# Patient Record
Sex: MALE | Race: WHITE | NOT HISPANIC OR LATINO | URBAN - METROPOLITAN AREA
[De-identification: names, ages, dates, MRNs, and addresses within clinical notes are randomized per-mention and may not be internally consistent; named-entity substitution may affect disease eponyms.]

---

## 2017-05-05 ENCOUNTER — INPATIENT (INPATIENT)
Facility: HOSPITAL | Age: 47
LOS: 18 days | Discharge: HOME | DRG: 885 | End: 2017-05-24
Attending: STUDENT IN AN ORGANIZED HEALTH CARE EDUCATION/TRAINING PROGRAM | Admitting: STUDENT IN AN ORGANIZED HEALTH CARE EDUCATION/TRAINING PROGRAM
Payer: MEDICAID

## 2017-05-05 VITALS
HEART RATE: 96 BPM | SYSTOLIC BLOOD PRESSURE: 119 MMHG | HEIGHT: 71 IN | TEMPERATURE: 98 F | DIASTOLIC BLOOD PRESSURE: 80 MMHG | WEIGHT: 166.89 LBS | RESPIRATION RATE: 18 BRPM

## 2017-05-05 RX ORDER — ACETAMINOPHEN 500 MG
650 TABLET ORAL EVERY 6 HOURS
Qty: 0 | Refills: 0 | Status: DISCONTINUED | OUTPATIENT
Start: 2017-05-05 | End: 2017-05-24

## 2017-05-05 RX ORDER — TRAZODONE HCL 50 MG
50 TABLET ORAL AT BEDTIME
Qty: 0 | Refills: 0 | Status: DISCONTINUED | OUTPATIENT
Start: 2017-05-05 | End: 2017-05-24

## 2017-05-05 RX ORDER — DIPHENHYDRAMINE HCL 50 MG
50 CAPSULE ORAL EVERY 4 HOURS
Qty: 0 | Refills: 0 | Status: DISCONTINUED | OUTPATIENT
Start: 2017-05-05 | End: 2017-05-24

## 2017-05-06 DIAGNOSIS — F10.99 ALCOHOL USE, UNSPECIFIED WITH UNSPECIFIED ALCOHOL-INDUCED DISORDER: ICD-10-CM

## 2017-05-06 PROCEDURE — 99231 SBSQ HOSP IP/OBS SF/LOW 25: CPT

## 2017-05-06 NOTE — ED BEHAVIORAL HEALTH ASSESSMENT NOTE - RISK ASSESSMENT
He is at high risk of acute and imminent danger to himself; requires a more restrictive setting of care.

## 2017-05-06 NOTE — ED BEHAVIORAL HEALTH ASSESSMENT NOTE - HPI (INCLUDE ILLNESS QUALITY, SEVERITY, DURATION, TIMING, CONTEXT, MODIFYING FACTORS, ASSOCIATED SIGNS AND SYMPTOMS)
45 yo M with a history of schizophrenia and alcohol use disorder presented to the ED after a fall; in the context of acute EtOH intoxication. Pt states that he had maintained sobriety for the past 3 months, but relapsed when he was with friends last night. He tripped and fell while acutely intoxicated. Medical workup has been negative including imaging. Upon attaining sobriety in the ED, he voiced suicidal ideation and had attempted to throw himself in front of a car the previous night. He continued to maintain that he was still feeling suicidal with a plan to harm himself by similar means as the night before. Pt also states that he was having command auditory hallucinations to hurt himself. Continues to feel depressed, despondent and not being future oriented. Patient is estranged from his family. Acknowledges that he has an EtOH problem and would like to get better. Pt stopped his medications upon discharge from [facility] 3 weeks ago, citing that they don't work. Last drink was 9:30pm the previous night.

## 2017-05-06 NOTE — ED BEHAVIORAL HEALTH ASSESSMENT NOTE - SUMMARY
47 yo M with a history of schizophrenia and alcohol use disorder presented to the ED after a fall; in the context of acute EtOH intoxication. Currently, he continues to feel depressed but denies any suicidal ideation at this time, while expressing some interest to overcome his addiction to EtOH. Etiology of initial presentation is likely due to multiple variables, including an underlying mood component that was worsened by his relapse on EtOH. Last drink was 9:30pm the previous night. Plan to restart a different anti-depressant and treat EtOH as necessary for withdrawal.

## 2017-05-06 NOTE — ED BEHAVIORAL HEALTH ASSESSMENT NOTE - DETAILS
[facility] 3 weeks ago n/a hx of suicide attempts while intoxicated morning handoff

## 2017-05-07 PROCEDURE — 99231 SBSQ HOSP IP/OBS SF/LOW 25: CPT

## 2017-05-08 PROCEDURE — 99223 1ST HOSP IP/OBS HIGH 75: CPT

## 2017-05-08 RX ORDER — HALOPERIDOL DECANOATE 100 MG/ML
2 INJECTION INTRAMUSCULAR AT BEDTIME
Qty: 0 | Refills: 0 | Status: DISCONTINUED | OUTPATIENT
Start: 2017-05-08 | End: 2017-05-10

## 2017-05-08 RX ORDER — THIAMINE MONONITRATE (VIT B1) 100 MG
100 TABLET ORAL
Qty: 0 | Refills: 0 | Status: DISCONTINUED | OUTPATIENT
Start: 2017-05-08 | End: 2017-05-24

## 2017-05-08 RX ORDER — ESCITALOPRAM OXALATE 10 MG/1
10 TABLET, FILM COATED ORAL DAILY
Qty: 0 | Refills: 0 | Status: DISCONTINUED | OUTPATIENT
Start: 2017-05-08 | End: 2017-05-12

## 2017-05-08 RX ORDER — FOLIC ACID 0.8 MG
1 TABLET ORAL DAILY
Qty: 0 | Refills: 0 | Status: DISCONTINUED | OUTPATIENT
Start: 2017-05-08 | End: 2017-05-24

## 2017-05-08 RX ADMIN — Medication 100 MILLIGRAM(S): at 22:07

## 2017-05-08 RX ADMIN — HALOPERIDOL DECANOATE 2 MILLIGRAM(S): 100 INJECTION INTRAMUSCULAR at 22:06

## 2017-05-09 LAB
ALBUMIN SERPL ELPH-MCNC: 3 G/DL — LOW (ref 3.4–5)
ALP SERPL-CCNC: 72 U/L — SIGNIFICANT CHANGE UP (ref 40–120)
ALT FLD-CCNC: 17 U/L — SIGNIFICANT CHANGE UP (ref 12–42)
ANION GAP SERPL CALC-SCNC: 5 MMOL/L — LOW (ref 9–16)
AST SERPL-CCNC: 13 U/L — LOW (ref 15–37)
BILIRUB SERPL-MCNC: 0.7 MG/DL — SIGNIFICANT CHANGE UP (ref 0.2–1.2)
BUN SERPL-MCNC: 14 MG/DL — SIGNIFICANT CHANGE UP (ref 7–23)
CALCIUM SERPL-MCNC: 8.1 MG/DL — LOW (ref 8.5–10.5)
CHLORIDE SERPL-SCNC: 106 MMOL/L — SIGNIFICANT CHANGE UP (ref 96–108)
CO2 SERPL-SCNC: 27 MMOL/L — SIGNIFICANT CHANGE UP (ref 22–31)
CREAT SERPL-MCNC: 0.61 MG/DL — SIGNIFICANT CHANGE UP (ref 0.5–1.3)
FOLATE SERPL-MCNC: 10.9 NG/ML — SIGNIFICANT CHANGE UP (ref 4.8–24.2)
GLUCOSE SERPL-MCNC: 95 MG/DL — SIGNIFICANT CHANGE UP (ref 70–99)
HBA1C BLD-MCNC: 5.3 % — SIGNIFICANT CHANGE UP (ref 4.8–5.6)
HCT VFR BLD CALC: 40.3 % — SIGNIFICANT CHANGE UP (ref 39–50)
HGB BLD-MCNC: 13.8 G/DL — SIGNIFICANT CHANGE UP (ref 13–17)
MCHC RBC-ENTMCNC: 30.1 PG — SIGNIFICANT CHANGE UP (ref 27–34)
MCHC RBC-ENTMCNC: 34.2 G/DL — SIGNIFICANT CHANGE UP (ref 32–36)
MCV RBC AUTO: 88 FL — SIGNIFICANT CHANGE UP (ref 80–100)
PLATELET # BLD AUTO: 224 K/UL — SIGNIFICANT CHANGE UP (ref 150–400)
POTASSIUM SERPL-MCNC: 4.1 MMOL/L — SIGNIFICANT CHANGE UP (ref 3.5–5.3)
POTASSIUM SERPL-SCNC: 4.1 MMOL/L — SIGNIFICANT CHANGE UP (ref 3.5–5.3)
PROT SERPL-MCNC: 6.6 G/DL — SIGNIFICANT CHANGE UP (ref 6.4–8.2)
RBC # BLD: 4.58 M/UL — SIGNIFICANT CHANGE UP (ref 4.2–5.8)
RBC # FLD: 14.4 % — SIGNIFICANT CHANGE UP (ref 10.3–16.9)
SODIUM SERPL-SCNC: 138 MMOL/L — SIGNIFICANT CHANGE UP (ref 135–145)
VIT B12 SERPL-MCNC: 391 PG/ML — SIGNIFICANT CHANGE UP (ref 243–894)
WBC # BLD: 6.9 K/UL — SIGNIFICANT CHANGE UP (ref 3.8–10.5)
WBC # FLD AUTO: 6.9 K/UL — SIGNIFICANT CHANGE UP (ref 3.8–10.5)

## 2017-05-09 PROCEDURE — 99232 SBSQ HOSP IP/OBS MODERATE 35: CPT

## 2017-05-09 RX ADMIN — Medication 1 MILLIGRAM(S): at 10:20

## 2017-05-09 RX ADMIN — Medication 100 MILLIGRAM(S): at 10:20

## 2017-05-09 RX ADMIN — ESCITALOPRAM OXALATE 10 MILLIGRAM(S): 10 TABLET, FILM COATED ORAL at 10:20

## 2017-05-09 RX ADMIN — HALOPERIDOL DECANOATE 2 MILLIGRAM(S): 100 INJECTION INTRAMUSCULAR at 21:51

## 2017-05-09 RX ADMIN — Medication 100 MILLIGRAM(S): at 21:51

## 2017-05-10 PROCEDURE — 99232 SBSQ HOSP IP/OBS MODERATE 35: CPT

## 2017-05-10 RX ORDER — HALOPERIDOL DECANOATE 100 MG/ML
5 INJECTION INTRAMUSCULAR AT BEDTIME
Qty: 0 | Refills: 0 | Status: DISCONTINUED | OUTPATIENT
Start: 2017-05-10 | End: 2017-05-18

## 2017-05-10 RX ADMIN — ESCITALOPRAM OXALATE 10 MILLIGRAM(S): 10 TABLET, FILM COATED ORAL at 11:18

## 2017-05-10 RX ADMIN — Medication 1 MILLIGRAM(S): at 11:18

## 2017-05-10 RX ADMIN — Medication 100 MILLIGRAM(S): at 11:18

## 2017-05-10 RX ADMIN — Medication 100 MILLIGRAM(S): at 21:56

## 2017-05-10 RX ADMIN — HALOPERIDOL DECANOATE 5 MILLIGRAM(S): 100 INJECTION INTRAMUSCULAR at 21:56

## 2017-05-11 PROCEDURE — 99232 SBSQ HOSP IP/OBS MODERATE 35: CPT

## 2017-05-11 RX ADMIN — HALOPERIDOL DECANOATE 5 MILLIGRAM(S): 100 INJECTION INTRAMUSCULAR at 21:10

## 2017-05-11 RX ADMIN — Medication 100 MILLIGRAM(S): at 21:10

## 2017-05-11 RX ADMIN — Medication 1 MILLIGRAM(S): at 10:42

## 2017-05-11 RX ADMIN — Medication 100 MILLIGRAM(S): at 10:41

## 2017-05-11 RX ADMIN — ESCITALOPRAM OXALATE 10 MILLIGRAM(S): 10 TABLET, FILM COATED ORAL at 10:42

## 2017-05-12 PROCEDURE — 99232 SBSQ HOSP IP/OBS MODERATE 35: CPT

## 2017-05-12 RX ORDER — ESCITALOPRAM OXALATE 10 MG/1
15 TABLET, FILM COATED ORAL DAILY
Qty: 0 | Refills: 0 | Status: DISCONTINUED | OUTPATIENT
Start: 2017-05-12 | End: 2017-05-15

## 2017-05-12 RX ADMIN — Medication 100 MILLIGRAM(S): at 11:12

## 2017-05-12 RX ADMIN — ESCITALOPRAM OXALATE 15 MILLIGRAM(S): 10 TABLET, FILM COATED ORAL at 11:12

## 2017-05-12 RX ADMIN — HALOPERIDOL DECANOATE 5 MILLIGRAM(S): 100 INJECTION INTRAMUSCULAR at 21:34

## 2017-05-12 RX ADMIN — Medication 1 MILLIGRAM(S): at 11:12

## 2017-05-12 RX ADMIN — Medication 100 MILLIGRAM(S): at 21:34

## 2017-05-13 RX ADMIN — HALOPERIDOL DECANOATE 5 MILLIGRAM(S): 100 INJECTION INTRAMUSCULAR at 21:17

## 2017-05-13 RX ADMIN — Medication 100 MILLIGRAM(S): at 21:17

## 2017-05-13 RX ADMIN — Medication 1 MILLIGRAM(S): at 10:21

## 2017-05-13 RX ADMIN — ESCITALOPRAM OXALATE 15 MILLIGRAM(S): 10 TABLET, FILM COATED ORAL at 10:19

## 2017-05-13 RX ADMIN — Medication 100 MILLIGRAM(S): at 10:21

## 2017-05-14 RX ADMIN — Medication 1 MILLIGRAM(S): at 10:15

## 2017-05-14 RX ADMIN — Medication 100 MILLIGRAM(S): at 21:14

## 2017-05-14 RX ADMIN — ESCITALOPRAM OXALATE 15 MILLIGRAM(S): 10 TABLET, FILM COATED ORAL at 10:14

## 2017-05-14 RX ADMIN — HALOPERIDOL DECANOATE 5 MILLIGRAM(S): 100 INJECTION INTRAMUSCULAR at 21:14

## 2017-05-14 RX ADMIN — Medication 100 MILLIGRAM(S): at 10:14

## 2017-05-15 PROCEDURE — 99232 SBSQ HOSP IP/OBS MODERATE 35: CPT

## 2017-05-15 RX ORDER — ESCITALOPRAM OXALATE 10 MG/1
20 TABLET, FILM COATED ORAL DAILY
Qty: 0 | Refills: 0 | Status: DISCONTINUED | OUTPATIENT
Start: 2017-05-15 | End: 2017-05-15

## 2017-05-15 RX ORDER — ESCITALOPRAM OXALATE 10 MG/1
15 TABLET, FILM COATED ORAL DAILY
Qty: 0 | Refills: 0 | Status: DISCONTINUED | OUTPATIENT
Start: 2017-05-15 | End: 2017-05-24

## 2017-05-15 RX ADMIN — Medication 100 MILLIGRAM(S): at 21:01

## 2017-05-15 RX ADMIN — HALOPERIDOL DECANOATE 5 MILLIGRAM(S): 100 INJECTION INTRAMUSCULAR at 21:01

## 2017-05-15 RX ADMIN — Medication 1 MILLIGRAM(S): at 10:50

## 2017-05-15 RX ADMIN — Medication 100 MILLIGRAM(S): at 10:50

## 2017-05-15 RX ADMIN — ESCITALOPRAM OXALATE 15 MILLIGRAM(S): 10 TABLET, FILM COATED ORAL at 18:45

## 2017-05-16 PROCEDURE — 99232 SBSQ HOSP IP/OBS MODERATE 35: CPT

## 2017-05-16 RX ADMIN — Medication 50 MILLIGRAM(S): at 21:30

## 2017-05-16 RX ADMIN — Medication 100 MILLIGRAM(S): at 10:49

## 2017-05-16 RX ADMIN — Medication 100 MILLIGRAM(S): at 21:30

## 2017-05-16 RX ADMIN — Medication 1 MILLIGRAM(S): at 10:49

## 2017-05-16 RX ADMIN — ESCITALOPRAM OXALATE 15 MILLIGRAM(S): 10 TABLET, FILM COATED ORAL at 10:49

## 2017-05-16 RX ADMIN — HALOPERIDOL DECANOATE 5 MILLIGRAM(S): 100 INJECTION INTRAMUSCULAR at 21:29

## 2017-05-17 PROCEDURE — 99232 SBSQ HOSP IP/OBS MODERATE 35: CPT

## 2017-05-17 RX ADMIN — HALOPERIDOL DECANOATE 5 MILLIGRAM(S): 100 INJECTION INTRAMUSCULAR at 21:20

## 2017-05-17 RX ADMIN — Medication 100 MILLIGRAM(S): at 10:46

## 2017-05-17 RX ADMIN — ESCITALOPRAM OXALATE 15 MILLIGRAM(S): 10 TABLET, FILM COATED ORAL at 10:46

## 2017-05-17 RX ADMIN — Medication 100 MILLIGRAM(S): at 21:20

## 2017-05-17 RX ADMIN — Medication 1 MILLIGRAM(S): at 10:46

## 2017-05-18 RX ORDER — HALOPERIDOL DECANOATE 100 MG/ML
10 INJECTION INTRAMUSCULAR AT BEDTIME
Qty: 0 | Refills: 0 | Status: DISCONTINUED | OUTPATIENT
Start: 2017-05-18 | End: 2017-05-24

## 2017-05-18 RX ADMIN — Medication 100 MILLIGRAM(S): at 22:01

## 2017-05-18 RX ADMIN — Medication 1 MILLIGRAM(S): at 10:53

## 2017-05-18 RX ADMIN — Medication 50 MILLIGRAM(S): at 22:01

## 2017-05-18 RX ADMIN — ESCITALOPRAM OXALATE 15 MILLIGRAM(S): 10 TABLET, FILM COATED ORAL at 10:53

## 2017-05-18 RX ADMIN — Medication 100 MILLIGRAM(S): at 10:53

## 2017-05-18 RX ADMIN — HALOPERIDOL DECANOATE 10 MILLIGRAM(S): 100 INJECTION INTRAMUSCULAR at 22:01

## 2017-05-19 PROCEDURE — 99232 SBSQ HOSP IP/OBS MODERATE 35: CPT

## 2017-05-19 RX ADMIN — Medication 50 MILLIGRAM(S): at 21:20

## 2017-05-19 RX ADMIN — Medication 1 MILLIGRAM(S): at 10:44

## 2017-05-19 RX ADMIN — Medication 100 MILLIGRAM(S): at 10:44

## 2017-05-19 RX ADMIN — ESCITALOPRAM OXALATE 15 MILLIGRAM(S): 10 TABLET, FILM COATED ORAL at 10:44

## 2017-05-19 RX ADMIN — HALOPERIDOL DECANOATE 10 MILLIGRAM(S): 100 INJECTION INTRAMUSCULAR at 21:21

## 2017-05-19 RX ADMIN — Medication 100 MILLIGRAM(S): at 21:21

## 2017-05-20 RX ADMIN — ESCITALOPRAM OXALATE 15 MILLIGRAM(S): 10 TABLET, FILM COATED ORAL at 10:48

## 2017-05-20 RX ADMIN — Medication 1 MILLIGRAM(S): at 10:48

## 2017-05-20 RX ADMIN — Medication 100 MILLIGRAM(S): at 10:48

## 2017-05-20 RX ADMIN — Medication 100 MILLIGRAM(S): at 21:12

## 2017-05-20 RX ADMIN — HALOPERIDOL DECANOATE 10 MILLIGRAM(S): 100 INJECTION INTRAMUSCULAR at 21:12

## 2017-05-20 RX ADMIN — Medication 50 MILLIGRAM(S): at 21:12

## 2017-05-21 RX ADMIN — Medication 100 MILLIGRAM(S): at 21:43

## 2017-05-21 RX ADMIN — Medication 1 MILLIGRAM(S): at 10:34

## 2017-05-21 RX ADMIN — Medication 100 MILLIGRAM(S): at 10:34

## 2017-05-21 RX ADMIN — HALOPERIDOL DECANOATE 10 MILLIGRAM(S): 100 INJECTION INTRAMUSCULAR at 21:43

## 2017-05-21 RX ADMIN — ESCITALOPRAM OXALATE 15 MILLIGRAM(S): 10 TABLET, FILM COATED ORAL at 10:34

## 2017-05-22 PROCEDURE — 99232 SBSQ HOSP IP/OBS MODERATE 35: CPT

## 2017-05-22 RX ORDER — NALTREXONE HYDROCHLORIDE 50 MG/1
50 TABLET, FILM COATED ORAL DAILY
Qty: 0 | Refills: 0 | Status: DISCONTINUED | OUTPATIENT
Start: 2017-05-22 | End: 2017-05-24

## 2017-05-22 RX ADMIN — ESCITALOPRAM OXALATE 15 MILLIGRAM(S): 10 TABLET, FILM COATED ORAL at 10:12

## 2017-05-22 RX ADMIN — Medication 100 MILLIGRAM(S): at 10:11

## 2017-05-22 RX ADMIN — Medication 1 MILLIGRAM(S): at 10:11

## 2017-05-22 RX ADMIN — Medication 100 MILLIGRAM(S): at 21:24

## 2017-05-22 RX ADMIN — HALOPERIDOL DECANOATE 10 MILLIGRAM(S): 100 INJECTION INTRAMUSCULAR at 21:24

## 2017-05-23 PROCEDURE — 99232 SBSQ HOSP IP/OBS MODERATE 35: CPT

## 2017-05-23 RX ORDER — HALOPERIDOL DECANOATE 100 MG/ML
100 INJECTION INTRAMUSCULAR ONCE
Qty: 0 | Refills: 0 | Status: DISCONTINUED | OUTPATIENT
Start: 2017-05-23 | End: 2017-05-23

## 2017-05-23 RX ORDER — NALTREXONE HYDROCHLORIDE 50 MG/1
380 TABLET, FILM COATED ORAL ONCE
Qty: 0 | Refills: 0 | Status: COMPLETED | OUTPATIENT
Start: 2017-05-24 | End: 2017-05-24

## 2017-05-23 RX ORDER — HALOPERIDOL DECANOATE 100 MG/ML
100 INJECTION INTRAMUSCULAR ONCE
Qty: 0 | Refills: 0 | Status: COMPLETED | OUTPATIENT
Start: 2017-05-24 | End: 2017-05-24

## 2017-05-23 RX ADMIN — ESCITALOPRAM OXALATE 15 MILLIGRAM(S): 10 TABLET, FILM COATED ORAL at 11:24

## 2017-05-23 RX ADMIN — NALTREXONE HYDROCHLORIDE 50 MILLIGRAM(S): 50 TABLET, FILM COATED ORAL at 10:40

## 2017-05-23 RX ADMIN — Medication 1 MILLIGRAM(S): at 10:40

## 2017-05-23 RX ADMIN — HALOPERIDOL DECANOATE 10 MILLIGRAM(S): 100 INJECTION INTRAMUSCULAR at 22:58

## 2017-05-23 RX ADMIN — Medication 100 MILLIGRAM(S): at 22:58

## 2017-05-23 RX ADMIN — Medication 100 MILLIGRAM(S): at 10:40

## 2017-05-24 VITALS
RESPIRATION RATE: 18 BRPM | TEMPERATURE: 98 F | SYSTOLIC BLOOD PRESSURE: 120 MMHG | DIASTOLIC BLOOD PRESSURE: 84 MMHG | HEART RATE: 77 BPM

## 2017-05-24 PROCEDURE — 36415 COLL VENOUS BLD VENIPUNCTURE: CPT

## 2017-05-24 PROCEDURE — 85027 COMPLETE CBC AUTOMATED: CPT

## 2017-05-24 PROCEDURE — 83036 HEMOGLOBIN GLYCOSYLATED A1C: CPT

## 2017-05-24 PROCEDURE — 80053 COMPREHEN METABOLIC PANEL: CPT

## 2017-05-24 PROCEDURE — 82607 VITAMIN B-12: CPT

## 2017-05-24 PROCEDURE — 82746 ASSAY OF FOLIC ACID SERUM: CPT

## 2017-05-24 PROCEDURE — 99238 HOSP IP/OBS DSCHRG MGMT 30/<: CPT

## 2017-05-24 RX ORDER — ESCITALOPRAM OXALATE 10 MG/1
3 TABLET, FILM COATED ORAL
Qty: 45 | Refills: 0
Start: 2017-05-24 | End: 2017-06-08

## 2017-05-24 RX ORDER — HALOPERIDOL DECANOATE 100 MG/ML
1 INJECTION INTRAMUSCULAR
Qty: 5 | Refills: 0
Start: 2017-05-24 | End: 2017-05-29

## 2017-05-24 RX ADMIN — NALTREXONE HYDROCHLORIDE 50 MILLIGRAM(S): 50 TABLET, FILM COATED ORAL at 10:23

## 2017-05-24 RX ADMIN — HALOPERIDOL DECANOATE 100 MILLIGRAM(S): 100 INJECTION INTRAMUSCULAR at 10:13

## 2017-05-24 RX ADMIN — Medication 650 MILLIGRAM(S): at 13:28

## 2017-05-24 RX ADMIN — ESCITALOPRAM OXALATE 15 MILLIGRAM(S): 10 TABLET, FILM COATED ORAL at 10:23

## 2017-05-24 RX ADMIN — Medication 650 MILLIGRAM(S): at 10:24

## 2017-05-24 RX ADMIN — Medication 1 MILLIGRAM(S): at 10:21

## 2017-05-24 RX ADMIN — Medication 100 MILLIGRAM(S): at 10:21

## 2017-05-24 RX ADMIN — NALTREXONE HYDROCHLORIDE 380 MILLIGRAM(S): 50 TABLET, FILM COATED ORAL at 10:23

## 2017-05-26 DIAGNOSIS — F33.3 MAJOR DEPRESSIVE DISORDER, RECURRENT, SEVERE WITH PSYCHOTIC SYMPTOMS: ICD-10-CM

## 2017-05-26 DIAGNOSIS — F10.229 ALCOHOL DEPENDENCE WITH INTOXICATION, UNSPECIFIED: ICD-10-CM

## 2017-05-26 DIAGNOSIS — Z91.5 PERSONAL HISTORY OF SELF-HARM: ICD-10-CM

## 2017-05-26 DIAGNOSIS — Z91.19 PATIENT'S NONCOMPLIANCE WITH OTHER MEDICAL TREATMENT AND REGIMEN: ICD-10-CM

## 2017-05-26 DIAGNOSIS — F10.239 ALCOHOL DEPENDENCE WITH WITHDRAWAL, UNSPECIFIED: ICD-10-CM

## 2017-05-26 DIAGNOSIS — F32.9 MAJOR DEPRESSIVE DISORDER, SINGLE EPISODE, UNSPECIFIED: ICD-10-CM

## 2017-07-18 ENCOUNTER — EMERGENCY (EMERGENCY)
Facility: HOSPITAL | Age: 47
LOS: 1 days | Discharge: HOME | End: 2017-07-18
Attending: EMERGENCY MEDICINE | Admitting: EMERGENCY MEDICINE
Payer: MEDICAID

## 2017-07-18 ENCOUNTER — INPATIENT (INPATIENT)
Facility: HOSPITAL | Age: 47
LOS: 20 days | Discharge: HOME | End: 2017-08-08
Attending: PSYCHIATRY & NEUROLOGY | Admitting: PSYCHIATRY & NEUROLOGY
Payer: MEDICAID

## 2017-07-18 VITALS
SYSTOLIC BLOOD PRESSURE: 108 MMHG | OXYGEN SATURATION: 96 % | WEIGHT: 160.28 LBS | TEMPERATURE: 98 F | HEIGHT: 72 IN | HEART RATE: 57 BPM | DIASTOLIC BLOOD PRESSURE: 72 MMHG

## 2017-07-18 VITALS
DIASTOLIC BLOOD PRESSURE: 67 MMHG | HEART RATE: 51 BPM | SYSTOLIC BLOOD PRESSURE: 104 MMHG | OXYGEN SATURATION: 98 % | TEMPERATURE: 98 F

## 2017-07-18 VITALS — HEIGHT: 72 IN | WEIGHT: 169.98 LBS | TEMPERATURE: 99 F

## 2017-07-18 DIAGNOSIS — F20.9 SCHIZOPHRENIA, UNSPECIFIED: ICD-10-CM

## 2017-07-18 DIAGNOSIS — F32.9 MAJOR DEPRESSIVE DISORDER, SINGLE EPISODE, UNSPECIFIED: ICD-10-CM

## 2017-07-18 DIAGNOSIS — F10.21 ALCOHOL DEPENDENCE, IN REMISSION: ICD-10-CM

## 2017-07-18 LAB
ALBUMIN SERPL ELPH-MCNC: 4.1 G/DL — SIGNIFICANT CHANGE UP (ref 3.3–5)
ALP SERPL-CCNC: 69 U/L — SIGNIFICANT CHANGE UP (ref 40–120)
ALT FLD-CCNC: 8 U/L RC — LOW (ref 10–45)
ANION GAP SERPL CALC-SCNC: 10 MMOL/L — SIGNIFICANT CHANGE UP (ref 5–17)
AST SERPL-CCNC: 12 U/L — SIGNIFICANT CHANGE UP (ref 10–40)
BILIRUB SERPL-MCNC: 0.8 MG/DL — SIGNIFICANT CHANGE UP (ref 0.2–1.2)
BUN SERPL-MCNC: 12 MG/DL — SIGNIFICANT CHANGE UP (ref 7–23)
CALCIUM SERPL-MCNC: 9.3 MG/DL — SIGNIFICANT CHANGE UP (ref 8.4–10.5)
CHLORIDE SERPL-SCNC: 102 MMOL/L — SIGNIFICANT CHANGE UP (ref 96–108)
CO2 SERPL-SCNC: 28 MMOL/L — SIGNIFICANT CHANGE UP (ref 22–31)
CREAT SERPL-MCNC: 0.89 MG/DL — SIGNIFICANT CHANGE UP (ref 0.5–1.3)
ETHANOL SERPL-MCNC: SIGNIFICANT CHANGE UP MG/DL (ref 0–10)
GLUCOSE SERPL-MCNC: 81 MG/DL — SIGNIFICANT CHANGE UP (ref 70–99)
HCT VFR BLD CALC: 47.5 % — SIGNIFICANT CHANGE UP (ref 39–50)
HGB BLD-MCNC: 16 G/DL — SIGNIFICANT CHANGE UP (ref 13–17)
MCHC RBC-ENTMCNC: 31.7 PG — SIGNIFICANT CHANGE UP (ref 27–34)
MCHC RBC-ENTMCNC: 33.8 GM/DL — SIGNIFICANT CHANGE UP (ref 32–36)
MCV RBC AUTO: 93.8 FL — SIGNIFICANT CHANGE UP (ref 80–100)
PCP SPEC-MCNC: SIGNIFICANT CHANGE UP
PLATELET # BLD AUTO: 218 K/UL — SIGNIFICANT CHANGE UP (ref 150–400)
POTASSIUM SERPL-MCNC: 3.8 MMOL/L — SIGNIFICANT CHANGE UP (ref 3.5–5.3)
POTASSIUM SERPL-SCNC: 3.8 MMOL/L — SIGNIFICANT CHANGE UP (ref 3.5–5.3)
PROT SERPL-MCNC: 7.2 G/DL — SIGNIFICANT CHANGE UP (ref 6–8.3)
RBC # BLD: 5.06 M/UL — SIGNIFICANT CHANGE UP (ref 4.2–5.8)
RBC # FLD: 12.6 % — SIGNIFICANT CHANGE UP (ref 10.3–14.5)
SODIUM SERPL-SCNC: 140 MMOL/L — SIGNIFICANT CHANGE UP (ref 135–145)
WBC # BLD: 7.9 K/UL — SIGNIFICANT CHANGE UP (ref 3.8–10.5)
WBC # FLD AUTO: 7.9 K/UL — SIGNIFICANT CHANGE UP (ref 3.8–10.5)

## 2017-07-18 PROCEDURE — 99285 EMERGENCY DEPT VISIT HI MDM: CPT

## 2017-07-18 PROCEDURE — 80053 COMPREHEN METABOLIC PANEL: CPT

## 2017-07-18 PROCEDURE — 93010 ELECTROCARDIOGRAM REPORT: CPT

## 2017-07-18 PROCEDURE — 99285 EMERGENCY DEPT VISIT HI MDM: CPT | Mod: 25

## 2017-07-18 PROCEDURE — 93005 ELECTROCARDIOGRAM TRACING: CPT

## 2017-07-18 PROCEDURE — 99284 EMERGENCY DEPT VISIT MOD MDM: CPT | Mod: 25

## 2017-07-18 PROCEDURE — 85027 COMPLETE CBC AUTOMATED: CPT

## 2017-07-18 PROCEDURE — 80307 DRUG TEST PRSMV CHEM ANLYZR: CPT

## 2017-07-18 RX ORDER — HALOPERIDOL DECANOATE 100 MG/ML
5 INJECTION INTRAMUSCULAR ONCE
Qty: 0 | Refills: 0 | Status: DISCONTINUED | OUTPATIENT
Start: 2017-07-18 | End: 2017-08-08

## 2017-07-18 RX ORDER — LANOLIN ALCOHOL/MO/W.PET/CERES
3 CREAM (GRAM) TOPICAL AT BEDTIME
Qty: 0 | Refills: 0 | Status: DISCONTINUED | OUTPATIENT
Start: 2017-07-18 | End: 2017-08-08

## 2017-07-18 RX ORDER — POTASSIUM CHLORIDE 20 MEQ
10 PACKET (EA) ORAL
Qty: 0 | Refills: 0 | Status: DISCONTINUED | OUTPATIENT
Start: 2017-07-18 | End: 2017-07-18

## 2017-07-18 RX ORDER — HALOPERIDOL DECANOATE 100 MG/ML
5 INJECTION INTRAMUSCULAR EVERY 6 HOURS
Qty: 0 | Refills: 0 | Status: DISCONTINUED | OUTPATIENT
Start: 2017-07-18 | End: 2017-08-08

## 2017-07-18 RX ORDER — DIPHENHYDRAMINE HCL 50 MG
50 CAPSULE ORAL ONCE
Qty: 0 | Refills: 0 | Status: DISCONTINUED | OUTPATIENT
Start: 2017-07-18 | End: 2017-08-08

## 2017-07-18 RX ORDER — DIPHENHYDRAMINE HCL 50 MG
50 CAPSULE ORAL EVERY 6 HOURS
Qty: 0 | Refills: 0 | Status: DISCONTINUED | OUTPATIENT
Start: 2017-07-18 | End: 2017-08-08

## 2017-07-18 NOTE — ED ADULT NURSE NOTE - OBJECTIVE STATEMENT
47 year old male BIB EMS with c/o suicidal ideation to jump in front of traffic for a week now. Pt has past psychiatric hospitalizations ans suicide attempt by cutting wrist. Pt lives in a residence, denied drug and alcohol use, delusions and hallucinations. Pt said he has an ACT team and he has been compliant with his "monthly" injection. Pt was placed on 1:1 for safety. 47 year old male BIB EMS with c/o suicidal ideation to jump in front of traffic for a week. Pt has past psychiatric hospitalizations and suicide attempt by cutting wrist. Pt lives in a residence, denied drug and alcohol use, delusions and hallucinations. Pt said he has an ACT team and he has been compliant with his "monthly" injection. Pt was placed on 1:1 for safety.

## 2017-07-18 NOTE — ED BEHAVIORAL HEALTH ASSESSMENT NOTE - PSYCHIATRIC ISSUES AND PLAN (INCLUDE STANDING AND PRN MEDICATION)
Admit to inpt psych, voluntary admission. Routine observation. No oral meds at present, would need to check when patient got last Halodol dec from Dr Solomon. Haldol 5 mg q6hrs PRN agitation, Benadryl 50 mg q6hrs PRN agitation.

## 2017-07-18 NOTE — ED BEHAVIORAL HEALTH ASSESSMENT NOTE - OTHER
Aixa, staff at [facility] roommate Recent change in living situation (moved to group home 2 weeks ago) Seeking help Tattoos of death on his forearm. Dirty fingernails. Malodorous blunted fair to poor

## 2017-07-18 NOTE — ED BEHAVIORAL HEALTH ASSESSMENT NOTE - HPI (INCLUDE ILLNESS QUALITY, SEVERITY, DURATION, TIMING, CONTEXT, MODIFYING FACTORS, ASSOCIATED SIGNS AND SYMPTOMS)
47 year old male, living in an assisted living, with past psychiatric history of schizophrenia/schizoaffective disorder, 6-7 past psych hospitalizations (most recent last year), at least 4 suicide attempts, currently on ACT team with [facility], presented to the ER because of suicidal ideations.     Patient reported that he is depressed and going to kill himself. Patient reports that his current suicide plan is to jump in front of a car but he he was fearful that he would actually do it so he told someone at the assisted living home to call 911. Patient reports hearing voices in head that are telling him to kill himself. He states when he is feeling depressed the voices are there and when he is feeling “happy” the voices are only there sometimes.     Patient reported he has felt like this before and has previous suicide attempts. Patient said a year ago he tried to jump in front of a car but the car stopped, 2 years ago he tried to jump in front of a train but the police stopped him, 5 years ago he tried to overdose on 35 tylenol PM pills, and 5 years ago he tried to slit his wrist but stated he did not cut deep enough. Patient reports being in a psychiatric hospital 7-8 times for suicide attempts. Patient is being followed by an ACT team, the last appointment with the team was one week ago. Patient is on Haloperidol 100mg injection IM once a month (date unknown) and Naltrexone 380 mg IM injection once a month (date unknown). Patient states the last time he got his injections was one month ago, but didn't know date.    Patient stated when he has these depressive episodes they last about a month. Patient reported being currently depressed because he misses his children and he has to take a bus to see them at [facility]. Patient sees his children, 2 girls ages 16 and 18, once a week. Patient reported poor sleep but a good appetite. Patient stated that he does not have a lot of energy and that he lays around in bed all day. Patient reported he spends his time walking around and sometimes likes to read but he has not felt like doing these activities lately. Patient admits to some leonard, reported days of a lot of energy where he will only sleep for 1 hour but denies impulsive spending. Patient stated these episodes will last for about 5 days. Patient admits to being an alcoholic but reports being sober for 3 months. Patient stated that he used to drink half a pint of vodka a day. Patient denies feeling guilty, HI, delusions, and paranoia.    Aixa, staff at [facility], reported that pt walked into their office, reporting above. He looked internally preoccupied, different from at his intake two weeks ago. She also reported pt has history of severe alcoholism.    Called Dr Solomon [phone] [phone], pt's ACT team psychiatrist, unable to leave voicemail. 47 year old male, living in an assisted living, with past psychiatric history of schizophrenia/schizoaffective disorder, 6-7 past psych hospitalizations (most recent last year), at least 4 suicide attempts, currently on ACT team with [facility], presented to the ER because of suicidal ideations.     Patient reported that he is depressed and going to kill himself. Patient reports that his current suicide plan is to jump in front of a car but he he was fearful that he would actually do it so he told someone at the assisted living home to call 911. Patient reports hearing voices in head that are telling him to kill himself. He states when he is feeling depressed the voices are there and when he is feeling “happy” the voices are only there sometimes.     Patient reported he has felt like this before and has previous suicide attempts. Patient said a year ago he tried to jump in front of a car but the car stopped, 2 years ago he tried to jump in front of a train but the police stopped him, 5 years ago he tried to overdose on 35 tylenol PM pills, and 5 years ago he tried to slit his wrist but stated he did not cut deep enough. Patient reports being in a psychiatric hospital 7-8 times for suicide attempts. Patient is being followed by an ACT team, the last appointment with the team was one week ago. Patient is on Haloperidol 100mg injection IM once a month (date unknown) and Naltrexone 380 mg IM injection once a month (date unknown). Patient states the last time he got his injections was one month ago, but didn't know date.    Patient stated when he has these depressive episodes they last about a month. Patient reported being currently depressed because he misses his children and he has to take a bus to see them at [facility]. Patient sees his children, 2 girls ages 16 and 18, once a week. Patient reported poor sleep but a good appetite. Patient stated that he does not have a lot of energy and that he lays around in bed all day. Patient reported he spends his time walking around and sometimes likes to read but he has not felt like doing these activities lately. Patient admits to some leonard, reported days of a lot of energy where he will only sleep for 1 hour but denies impulsive spending. Patient stated these episodes will last for about 5 days. Patient admits to being an alcoholic but reports being sober for 3 months. Patient stated that he used to drink half a pint of vodka a day. Patient denies feeling guilty, HI, delusions, and paranoia.    Aixa, staff at [facility], reported that pt walked into their office, reporting above. He looked internally preoccupied, different from at his intake two weeks ago. She also reported pt has history of severe alcoholism.    Called Dr Solomon [phone] [phone], pt's ACT team psychiatrist, unable to leave voicemail.    Brother Malcolm [phone].

## 2017-07-18 NOTE — ED PROVIDER NOTE - OBJECTIVE STATEMENT
48yo M hx of schizoaffective vs schizophrenia on long acting injectable q month, lives in assisted living facility, BIBEMS for SI. Pt states supervisor called EMS after he endorsed 46yo M hx of schizoaffective vs schizophrenia on long acting injectable q month, lives in assisted living facility, BIBEMS for SI. Pt states supervisor called EMS after he endorsed SI. Has plan - jump in front of train. Prior suicidality - 1 attempt - cut wrists. 46yo M hx of schizoaffective vs schizophrenia on long acting injectable q month, lives in assisted living facility, BIBEMS for SI. Pt states supervisor called EMS after he endorsed SI. Has plan - jump in front of train. Prior suicidality - 1 attempt - cut wrists.  Denies OD/intox. Compliant with monthly injections.

## 2017-07-18 NOTE — ED PROVIDER NOTE - MEDICAL DECISION MAKING DETAILS
Attendin year-old male patient with past hx of schizophrenia, receiving monthly Haldol injections, brought to ED due to suicidal ideations consisting of jumping in front of a car. Patient was found in no acute distress, cooperative with interview and examination, but describing persistent suicidal ideation. Psychiatry consulted. I agree with resident assessment and plan. Dr. Abdifatah Jackson

## 2017-07-18 NOTE — ED BEHAVIORAL HEALTH ASSESSMENT NOTE - AXIS IV
Housing problems/Economic problems/Occupational problems/Problems with primary support/Problems with access to healthcare services/Problem related to social environment

## 2017-07-18 NOTE — ED PROVIDER NOTE - PROGRESS NOTE DETAILS
Patient was evaluated by me, found in no acute distress, calm, speaking in complete sentences. I agree with resident assessment and plan. Dr. Abdifatah Jackson Per PEARL pt has bed at [place] [id number], Dr. ROSETTA Mendoza, Resident

## 2017-07-18 NOTE — ED BEHAVIORAL HEALTH ASSESSMENT NOTE - DETAILS
Ideation to walk in front of car Alcoholism hearing voices to hurt self Dr Mann Edmund Mendoza Rama Simpson, on call MD

## 2017-07-18 NOTE — CHART NOTE - NSCHARTNOTEFT_GEN_A_CORE
Screening Medical Evaluation  Patient Admitted from: [facility] admitting diagnosis:Major depressive disorder with single episode    Allergies    No Known Allergies      MEDICATIONS  (STANDING):    MEDICATIONS  (PRN):  melatonin. 3 milliGRAM(s) Oral at bedtime PRN Insomnia  haloperidol     Tablet 5 milliGRAM(s) Oral every 6 hours PRN Agitation  haloperidol    Injectable 5 milliGRAM(s) IntraMuscular once PRN Agitation  diphenhydrAMINE   Injectable 50 milliGRAM(s) IntraMuscular once PRN Agitation  LORazepam   Injectable 2 milliGRAM(s) IntraMuscular once PRN Agitation  LORazepam     Tablet 2 milliGRAM(s) Oral every 6 hours PRN Anxiety/Agitation  diphenhydrAMINE   Capsule 50 milliGRAM(s) Oral every 6 hours PRN Anxiety/Agitation      Vital Signs Last 24 Hrs  T(C): 37.1 (07-18-17 @ 19:20), Max: 37.1 (07-18-17 @ 19:20)  HR: 51 (07-18-17 @ 18:06) (51 - 57)  BP: 104/67 (07-18-17 @ 18:06) (104/67 - 108/72)  RR: --  SpO2: 98% (07-18-17 @ 18:06) (96% - 98%)  CAPILLARY BLOOD GLUCOSE        I&O's Summary      PHYSICAL EXAM:  GENERAL: NAD, well-developed  HEAD:  Atraumatic, Normocephalic  EYES: EOMI, PERRLA, conjunctiva and sclera clear  NECK: Supple, No JVD  CHEST/LUNG: Clear to auscultation bilaterally; No wheeze  HEART: Regular rate and rhythm; No murmurs, rubs, or gallops  ABDOMEN: Soft, Nontender, Nondistended; Bowel sounds present  EXTREMITIES:  2+ Peripheral Pulses, No clubbing, cyanosis, or edema  PSYCH: AAOx3  NEUROLOGY: non-focal  SKIN: No rashes or lesions    LABS:                        16.0   7.9   )-----------( 218      ( 18 Jul 2017 15:47 )             47.5     07-18    140  |  102  |  12  ----------------------------<  81  3.8   |  28  |  0.89    Ca    9.3      18 Jul 2017 15:47    TPro  7.2  /  Alb  4.1  /  TBili  0.8  /  DBili  x   /  AST  12  /  ALT  8<L>  /  AlkPhos  69  07-18          Assessment and Plan: 48 y/o M with no sig exam findings

## 2017-07-18 NOTE — ED BEHAVIORAL HEALTH ASSESSMENT NOTE - RISK ASSESSMENT
In patient's risk factors, active CAH to kill self, many previous attempts, prior psych hospitalizations, recent change in living situation, white, older age, limited social support, former substance use. In pt's protective factors, living in group home, no longer homeless, denies recent substance, has two daughters he cares about, willing  to come in for help. He feels safe in hospital. At present, risks are high, will admit pt.

## 2017-07-18 NOTE — ED BEHAVIORAL HEALTH ASSESSMENT NOTE - DESCRIPTION
uneventful denied five brothers, one sister , poor relationship with family, used to work as , was homeless for two years prior to being in [facility] for two months, then moved to group home. Never . Two daughters aged 16 and 18, in [place].

## 2017-07-18 NOTE — ED PROVIDER NOTE - PHYSICAL EXAMINATION
AAOX3, NAD, NCAT, EOMI, PERRL, MMM, Neck Supple, RRR, CTABL, ABD S/NT/ND +BS, No CVAT, EXT warm, well perfused, No Edema, Distal Pulses Intact, No Rash,  MAEx4, Sensation to soft touch grossly intact, normal strength/tone. Flat affect. No internal preoccupation. Endorses auditory hallucinations. +SI

## 2017-07-18 NOTE — ED BEHAVIORAL HEALTH ASSESSMENT NOTE - SUMMARY
47 year old male, living in assisted living ([facility]), with past psychiatric history of schizophrenia/schizoaffective disorder, 6-7 past psych hospitalizations (most recent last year), at least 4 suicide attempts, currently on ACT team with [facility], who presents with suicidal ideation, auditory hallucinations (command) to walk in traffic, not at his baseline as per group home staff. Affect blunted on exam, incongruent with stated mood. Pt not sure when he last got his Haldol 100 mg IM, but says it was about a month ago. Did not feel safe leaving ED. Willing to sign voluntaries.

## 2017-07-19 LAB
CHOLEST SERPL-MCNC: 147 MG/DL — SIGNIFICANT CHANGE UP (ref 120–199)
HBA1C BLD-MCNC: 5.8 % — HIGH (ref 4–5.6)
HDLC SERPL-MCNC: 33 MG/DL — LOW (ref 35–55)
LIPID PNL WITH DIRECT LDL SERPL: 106 MG/DL — SIGNIFICANT CHANGE UP
TRIGL SERPL-MCNC: 72 MG/DL — SIGNIFICANT CHANGE UP (ref 10–149)

## 2017-07-19 PROCEDURE — 99223 1ST HOSP IP/OBS HIGH 75: CPT

## 2017-07-19 RX ORDER — BENZTROPINE MESYLATE 1 MG
0.5 TABLET ORAL
Qty: 0 | Refills: 0 | Status: DISCONTINUED | OUTPATIENT
Start: 2017-07-19 | End: 2017-08-08

## 2017-07-19 RX ORDER — HALOPERIDOL DECANOATE 100 MG/ML
2 INJECTION INTRAMUSCULAR
Qty: 0 | Refills: 0 | Status: DISCONTINUED | OUTPATIENT
Start: 2017-07-19 | End: 2017-07-27

## 2017-07-19 RX ADMIN — Medication 0.5 MILLIGRAM(S): at 20:56

## 2017-07-19 RX ADMIN — Medication 2 MILLIGRAM(S): at 20:56

## 2017-07-19 RX ADMIN — HALOPERIDOL DECANOATE 2 MILLIGRAM(S): 100 INJECTION INTRAMUSCULAR at 20:56

## 2017-07-19 RX ADMIN — Medication 50 MILLIGRAM(S): at 20:56

## 2017-07-20 PROCEDURE — 99232 SBSQ HOSP IP/OBS MODERATE 35: CPT

## 2017-07-20 RX ADMIN — HALOPERIDOL DECANOATE 2 MILLIGRAM(S): 100 INJECTION INTRAMUSCULAR at 08:58

## 2017-07-20 RX ADMIN — HALOPERIDOL DECANOATE 2 MILLIGRAM(S): 100 INJECTION INTRAMUSCULAR at 21:10

## 2017-07-20 RX ADMIN — Medication 0.5 MILLIGRAM(S): at 08:58

## 2017-07-20 RX ADMIN — Medication 0.5 MILLIGRAM(S): at 21:10

## 2017-07-21 PROCEDURE — 99232 SBSQ HOSP IP/OBS MODERATE 35: CPT

## 2017-07-21 RX ADMIN — HALOPERIDOL DECANOATE 2 MILLIGRAM(S): 100 INJECTION INTRAMUSCULAR at 21:04

## 2017-07-21 RX ADMIN — HALOPERIDOL DECANOATE 2 MILLIGRAM(S): 100 INJECTION INTRAMUSCULAR at 08:53

## 2017-07-21 RX ADMIN — Medication 0.5 MILLIGRAM(S): at 08:53

## 2017-07-21 RX ADMIN — Medication 0.5 MILLIGRAM(S): at 21:04

## 2017-07-22 PROCEDURE — 99232 SBSQ HOSP IP/OBS MODERATE 35: CPT

## 2017-07-22 RX ORDER — DIPHENHYDRAMINE HCL 50 MG
50 CAPSULE ORAL AT BEDTIME
Qty: 0 | Refills: 0 | Status: DISCONTINUED | OUTPATIENT
Start: 2017-07-22 | End: 2017-08-08

## 2017-07-22 RX ADMIN — HALOPERIDOL DECANOATE 2 MILLIGRAM(S): 100 INJECTION INTRAMUSCULAR at 21:41

## 2017-07-22 RX ADMIN — Medication 50 MILLIGRAM(S): at 21:41

## 2017-07-22 RX ADMIN — Medication 0.5 MILLIGRAM(S): at 09:14

## 2017-07-22 RX ADMIN — HALOPERIDOL DECANOATE 2 MILLIGRAM(S): 100 INJECTION INTRAMUSCULAR at 09:14

## 2017-07-22 RX ADMIN — Medication 3 MILLIGRAM(S): at 20:50

## 2017-07-22 RX ADMIN — Medication 0.5 MILLIGRAM(S): at 21:41

## 2017-07-23 PROCEDURE — 99232 SBSQ HOSP IP/OBS MODERATE 35: CPT

## 2017-07-23 RX ADMIN — HALOPERIDOL DECANOATE 2 MILLIGRAM(S): 100 INJECTION INTRAMUSCULAR at 21:14

## 2017-07-23 RX ADMIN — Medication 0.5 MILLIGRAM(S): at 21:14

## 2017-07-23 RX ADMIN — Medication 50 MILLIGRAM(S): at 21:14

## 2017-07-23 RX ADMIN — Medication 0.5 MILLIGRAM(S): at 09:22

## 2017-07-23 RX ADMIN — HALOPERIDOL DECANOATE 2 MILLIGRAM(S): 100 INJECTION INTRAMUSCULAR at 09:22

## 2017-07-23 RX ADMIN — Medication 3 MILLIGRAM(S): at 21:14

## 2017-07-24 PROCEDURE — 99232 SBSQ HOSP IP/OBS MODERATE 35: CPT

## 2017-07-24 RX ADMIN — Medication 0.5 MILLIGRAM(S): at 08:31

## 2017-07-24 RX ADMIN — HALOPERIDOL DECANOATE 2 MILLIGRAM(S): 100 INJECTION INTRAMUSCULAR at 21:05

## 2017-07-24 RX ADMIN — Medication 0.5 MILLIGRAM(S): at 21:05

## 2017-07-24 RX ADMIN — HALOPERIDOL DECANOATE 2 MILLIGRAM(S): 100 INJECTION INTRAMUSCULAR at 08:31

## 2017-07-24 RX ADMIN — Medication 3 MILLIGRAM(S): at 21:00

## 2017-07-24 RX ADMIN — Medication 50 MILLIGRAM(S): at 21:05

## 2017-07-25 PROCEDURE — 99232 SBSQ HOSP IP/OBS MODERATE 35: CPT

## 2017-07-25 RX ADMIN — Medication 50 MILLIGRAM(S): at 20:38

## 2017-07-25 RX ADMIN — Medication 0.5 MILLIGRAM(S): at 09:42

## 2017-07-25 RX ADMIN — HALOPERIDOL DECANOATE 2 MILLIGRAM(S): 100 INJECTION INTRAMUSCULAR at 20:38

## 2017-07-25 RX ADMIN — Medication 0.5 MILLIGRAM(S): at 20:38

## 2017-07-25 RX ADMIN — HALOPERIDOL DECANOATE 2 MILLIGRAM(S): 100 INJECTION INTRAMUSCULAR at 09:42

## 2017-07-26 PROCEDURE — 99232 SBSQ HOSP IP/OBS MODERATE 35: CPT

## 2017-07-26 RX ADMIN — HALOPERIDOL DECANOATE 2 MILLIGRAM(S): 100 INJECTION INTRAMUSCULAR at 20:33

## 2017-07-26 RX ADMIN — Medication 3 MILLIGRAM(S): at 20:30

## 2017-07-26 RX ADMIN — Medication 0.5 MILLIGRAM(S): at 20:32

## 2017-07-26 RX ADMIN — Medication 50 MILLIGRAM(S): at 20:32

## 2017-07-26 RX ADMIN — Medication 0.5 MILLIGRAM(S): at 08:49

## 2017-07-26 RX ADMIN — HALOPERIDOL DECANOATE 2 MILLIGRAM(S): 100 INJECTION INTRAMUSCULAR at 08:49

## 2017-07-27 PROCEDURE — 99232 SBSQ HOSP IP/OBS MODERATE 35: CPT

## 2017-07-27 RX ORDER — HALOPERIDOL DECANOATE 100 MG/ML
5 INJECTION INTRAMUSCULAR
Qty: 0 | Refills: 0 | Status: DISCONTINUED | OUTPATIENT
Start: 2017-07-27 | End: 2017-08-08

## 2017-07-27 RX ADMIN — Medication 50 MILLIGRAM(S): at 21:43

## 2017-07-27 RX ADMIN — HALOPERIDOL DECANOATE 5 MILLIGRAM(S): 100 INJECTION INTRAMUSCULAR at 21:43

## 2017-07-27 RX ADMIN — Medication 0.5 MILLIGRAM(S): at 08:04

## 2017-07-27 RX ADMIN — Medication 0.5 MILLIGRAM(S): at 21:43

## 2017-07-27 RX ADMIN — HALOPERIDOL DECANOATE 2 MILLIGRAM(S): 100 INJECTION INTRAMUSCULAR at 08:04

## 2017-07-28 PROCEDURE — 99232 SBSQ HOSP IP/OBS MODERATE 35: CPT

## 2017-07-28 RX ORDER — SERTRALINE 25 MG/1
50 TABLET, FILM COATED ORAL DAILY
Qty: 0 | Refills: 0 | Status: DISCONTINUED | OUTPATIENT
Start: 2017-07-29 | End: 2017-08-08

## 2017-07-28 RX ADMIN — Medication 0.5 MILLIGRAM(S): at 09:24

## 2017-07-28 RX ADMIN — Medication 0.5 MILLIGRAM(S): at 20:52

## 2017-07-28 RX ADMIN — Medication 3 MILLIGRAM(S): at 20:52

## 2017-07-28 RX ADMIN — Medication 50 MILLIGRAM(S): at 20:52

## 2017-07-28 RX ADMIN — HALOPERIDOL DECANOATE 5 MILLIGRAM(S): 100 INJECTION INTRAMUSCULAR at 09:24

## 2017-07-28 RX ADMIN — HALOPERIDOL DECANOATE 5 MILLIGRAM(S): 100 INJECTION INTRAMUSCULAR at 20:52

## 2017-07-29 RX ADMIN — Medication 50 MILLIGRAM(S): at 21:34

## 2017-07-29 RX ADMIN — Medication 0.5 MILLIGRAM(S): at 21:34

## 2017-07-29 RX ADMIN — HALOPERIDOL DECANOATE 5 MILLIGRAM(S): 100 INJECTION INTRAMUSCULAR at 09:00

## 2017-07-29 RX ADMIN — Medication 0.5 MILLIGRAM(S): at 09:00

## 2017-07-29 RX ADMIN — SERTRALINE 50 MILLIGRAM(S): 25 TABLET, FILM COATED ORAL at 09:00

## 2017-07-29 RX ADMIN — HALOPERIDOL DECANOATE 5 MILLIGRAM(S): 100 INJECTION INTRAMUSCULAR at 21:34

## 2017-07-30 RX ADMIN — SERTRALINE 50 MILLIGRAM(S): 25 TABLET, FILM COATED ORAL at 09:12

## 2017-07-30 RX ADMIN — Medication 0.5 MILLIGRAM(S): at 09:11

## 2017-07-30 RX ADMIN — HALOPERIDOL DECANOATE 5 MILLIGRAM(S): 100 INJECTION INTRAMUSCULAR at 09:11

## 2017-07-30 RX ADMIN — HALOPERIDOL DECANOATE 5 MILLIGRAM(S): 100 INJECTION INTRAMUSCULAR at 21:20

## 2017-07-30 RX ADMIN — Medication 50 MILLIGRAM(S): at 21:20

## 2017-07-30 RX ADMIN — Medication 0.5 MILLIGRAM(S): at 21:20

## 2017-07-31 PROCEDURE — 99232 SBSQ HOSP IP/OBS MODERATE 35: CPT

## 2017-07-31 RX ADMIN — Medication 0.5 MILLIGRAM(S): at 21:57

## 2017-07-31 RX ADMIN — Medication 50 MILLIGRAM(S): at 21:57

## 2017-07-31 RX ADMIN — HALOPERIDOL DECANOATE 5 MILLIGRAM(S): 100 INJECTION INTRAMUSCULAR at 21:57

## 2017-08-01 RX ORDER — HALOPERIDOL DECANOATE 100 MG/ML
100 INJECTION INTRAMUSCULAR ONCE
Qty: 0 | Refills: 0 | Status: COMPLETED | OUTPATIENT
Start: 2017-08-01 | End: 2017-08-01

## 2017-08-01 RX ADMIN — Medication 50 MILLIGRAM(S): at 21:24

## 2017-08-01 RX ADMIN — Medication 0.5 MILLIGRAM(S): at 09:20

## 2017-08-01 RX ADMIN — HALOPERIDOL DECANOATE 5 MILLIGRAM(S): 100 INJECTION INTRAMUSCULAR at 09:20

## 2017-08-01 RX ADMIN — Medication 0.5 MILLIGRAM(S): at 21:24

## 2017-08-01 RX ADMIN — HALOPERIDOL DECANOATE 100 MILLIGRAM(S): 100 INJECTION INTRAMUSCULAR at 21:24

## 2017-08-01 RX ADMIN — SERTRALINE 50 MILLIGRAM(S): 25 TABLET, FILM COATED ORAL at 09:20

## 2017-08-01 RX ADMIN — HALOPERIDOL DECANOATE 5 MILLIGRAM(S): 100 INJECTION INTRAMUSCULAR at 21:24

## 2017-08-02 PROCEDURE — 99232 SBSQ HOSP IP/OBS MODERATE 35: CPT

## 2017-08-02 RX ADMIN — HALOPERIDOL DECANOATE 5 MILLIGRAM(S): 100 INJECTION INTRAMUSCULAR at 08:35

## 2017-08-02 RX ADMIN — SERTRALINE 50 MILLIGRAM(S): 25 TABLET, FILM COATED ORAL at 08:35

## 2017-08-02 RX ADMIN — HALOPERIDOL DECANOATE 5 MILLIGRAM(S): 100 INJECTION INTRAMUSCULAR at 21:09

## 2017-08-02 RX ADMIN — Medication 0.5 MILLIGRAM(S): at 08:35

## 2017-08-02 RX ADMIN — Medication 0.5 MILLIGRAM(S): at 21:09

## 2017-08-02 RX ADMIN — Medication 50 MILLIGRAM(S): at 21:09

## 2017-08-03 PROCEDURE — 99232 SBSQ HOSP IP/OBS MODERATE 35: CPT

## 2017-08-03 RX ADMIN — SERTRALINE 50 MILLIGRAM(S): 25 TABLET, FILM COATED ORAL at 08:51

## 2017-08-03 RX ADMIN — HALOPERIDOL DECANOATE 5 MILLIGRAM(S): 100 INJECTION INTRAMUSCULAR at 20:56

## 2017-08-03 RX ADMIN — Medication 0.5 MILLIGRAM(S): at 08:51

## 2017-08-03 RX ADMIN — Medication 50 MILLIGRAM(S): at 20:56

## 2017-08-03 RX ADMIN — Medication 0.5 MILLIGRAM(S): at 20:56

## 2017-08-03 RX ADMIN — HALOPERIDOL DECANOATE 5 MILLIGRAM(S): 100 INJECTION INTRAMUSCULAR at 08:51

## 2017-08-04 PROCEDURE — 99232 SBSQ HOSP IP/OBS MODERATE 35: CPT

## 2017-08-04 RX ADMIN — Medication 50 MILLIGRAM(S): at 21:09

## 2017-08-04 RX ADMIN — HALOPERIDOL DECANOATE 5 MILLIGRAM(S): 100 INJECTION INTRAMUSCULAR at 09:19

## 2017-08-04 RX ADMIN — SERTRALINE 50 MILLIGRAM(S): 25 TABLET, FILM COATED ORAL at 09:19

## 2017-08-04 RX ADMIN — Medication 0.5 MILLIGRAM(S): at 09:19

## 2017-08-04 RX ADMIN — HALOPERIDOL DECANOATE 5 MILLIGRAM(S): 100 INJECTION INTRAMUSCULAR at 21:09

## 2017-08-04 RX ADMIN — Medication 0.5 MILLIGRAM(S): at 21:09

## 2017-08-05 RX ADMIN — SERTRALINE 50 MILLIGRAM(S): 25 TABLET, FILM COATED ORAL at 10:02

## 2017-08-05 RX ADMIN — HALOPERIDOL DECANOATE 5 MILLIGRAM(S): 100 INJECTION INTRAMUSCULAR at 10:02

## 2017-08-05 RX ADMIN — HALOPERIDOL DECANOATE 5 MILLIGRAM(S): 100 INJECTION INTRAMUSCULAR at 20:37

## 2017-08-05 RX ADMIN — Medication 0.5 MILLIGRAM(S): at 20:37

## 2017-08-05 RX ADMIN — Medication 0.5 MILLIGRAM(S): at 10:02

## 2017-08-05 RX ADMIN — Medication 50 MILLIGRAM(S): at 20:37

## 2017-08-06 RX ADMIN — Medication 3 MILLIGRAM(S): at 21:00

## 2017-08-06 RX ADMIN — Medication 0.5 MILLIGRAM(S): at 20:37

## 2017-08-06 RX ADMIN — HALOPERIDOL DECANOATE 5 MILLIGRAM(S): 100 INJECTION INTRAMUSCULAR at 08:47

## 2017-08-06 RX ADMIN — Medication 50 MILLIGRAM(S): at 20:37

## 2017-08-06 RX ADMIN — HALOPERIDOL DECANOATE 5 MILLIGRAM(S): 100 INJECTION INTRAMUSCULAR at 20:37

## 2017-08-06 RX ADMIN — Medication 0.5 MILLIGRAM(S): at 08:47

## 2017-08-06 RX ADMIN — SERTRALINE 50 MILLIGRAM(S): 25 TABLET, FILM COATED ORAL at 08:47

## 2017-08-07 PROCEDURE — 99232 SBSQ HOSP IP/OBS MODERATE 35: CPT

## 2017-08-07 RX ORDER — HALOPERIDOL DECANOATE 100 MG/ML
100 INJECTION INTRAMUSCULAR
Qty: 1 | Refills: 0
Start: 2017-08-07 | End: 2017-09-06

## 2017-08-07 RX ORDER — BENZTROPINE MESYLATE 1 MG
1 TABLET ORAL
Qty: 60 | Refills: 0
Start: 2017-08-07 | End: 2017-09-06

## 2017-08-07 RX ORDER — SERTRALINE 25 MG/1
1 TABLET, FILM COATED ORAL
Qty: 30 | Refills: 0
Start: 2017-08-07 | End: 2017-09-06

## 2017-08-07 RX ADMIN — SERTRALINE 50 MILLIGRAM(S): 25 TABLET, FILM COATED ORAL at 08:53

## 2017-08-07 RX ADMIN — HALOPERIDOL DECANOATE 5 MILLIGRAM(S): 100 INJECTION INTRAMUSCULAR at 08:53

## 2017-08-07 RX ADMIN — Medication 3 MILLIGRAM(S): at 21:00

## 2017-08-07 RX ADMIN — Medication 0.5 MILLIGRAM(S): at 08:53

## 2017-08-07 RX ADMIN — Medication 50 MILLIGRAM(S): at 21:35

## 2017-08-07 RX ADMIN — HALOPERIDOL DECANOATE 5 MILLIGRAM(S): 100 INJECTION INTRAMUSCULAR at 21:35

## 2017-08-07 RX ADMIN — Medication 0.5 MILLIGRAM(S): at 21:35

## 2017-08-08 VITALS — TEMPERATURE: 98 F | RESPIRATION RATE: 18 BRPM

## 2017-08-08 PROCEDURE — 99238 HOSP IP/OBS DSCHRG MGMT 30/<: CPT

## 2017-08-08 RX ADMIN — SERTRALINE 50 MILLIGRAM(S): 25 TABLET, FILM COATED ORAL at 09:29

## 2017-08-08 RX ADMIN — HALOPERIDOL DECANOATE 5 MILLIGRAM(S): 100 INJECTION INTRAMUSCULAR at 09:29

## 2017-08-08 RX ADMIN — Medication 0.5 MILLIGRAM(S): at 09:29

## 2017-10-17 ENCOUNTER — NON-APPOINTMENT (OUTPATIENT)
Age: 47
End: 2017-10-17

## 2017-10-17 ENCOUNTER — APPOINTMENT (OUTPATIENT)
Dept: INTERNAL MEDICINE | Facility: CLINIC | Age: 47
End: 2017-10-17
Payer: MEDICAID

## 2017-10-17 VITALS
SYSTOLIC BLOOD PRESSURE: 118 MMHG | OXYGEN SATURATION: 99 % | TEMPERATURE: 97.3 F | WEIGHT: 158 LBS | HEART RATE: 88 BPM | BODY MASS INDEX: 21.4 KG/M2 | DIASTOLIC BLOOD PRESSURE: 82 MMHG | HEIGHT: 72 IN | RESPIRATION RATE: 16 BRPM

## 2017-10-17 DIAGNOSIS — F17.200 NICOTINE DEPENDENCE, UNSPECIFIED, UNCOMPLICATED: ICD-10-CM

## 2017-10-17 DIAGNOSIS — Z83.3 FAMILY HISTORY OF DIABETES MELLITUS: ICD-10-CM

## 2017-10-17 DIAGNOSIS — F81.9 DEVELOPMENTAL DISORDER OF SCHOLASTIC SKILLS, UNSPECIFIED: ICD-10-CM

## 2017-10-17 DIAGNOSIS — Z87.898 PERSONAL HISTORY OF OTHER SPECIFIED CONDITIONS: ICD-10-CM

## 2017-10-17 PROCEDURE — 99386 PREV VISIT NEW AGE 40-64: CPT

## 2017-10-17 PROCEDURE — 93000 ELECTROCARDIOGRAM COMPLETE: CPT

## 2017-10-18 LAB
25(OH)D3 SERPL-MCNC: 26.1 NG/ML
ALBUMIN SERPL ELPH-MCNC: 4.3 G/DL
ALP BLD-CCNC: 79 U/L
ALT SERPL-CCNC: 10 U/L
ANION GAP SERPL CALC-SCNC: 15 MMOL/L
APPEARANCE: CLEAR
AST SERPL-CCNC: 14 U/L
BASOPHILS # BLD AUTO: 0.07 K/UL
BASOPHILS NFR BLD AUTO: 0.8 %
BILIRUB SERPL-MCNC: 0.5 MG/DL
BILIRUBIN URINE: NEGATIVE
BLOOD URINE: NEGATIVE
BUN SERPL-MCNC: 9 MG/DL
C TRACH RRNA SPEC QL NAA+PROBE: NOT DETECTED
CALCIUM SERPL-MCNC: 9.2 MG/DL
CHLORIDE SERPL-SCNC: 98 MMOL/L
CHOLEST SERPL-MCNC: 179 MG/DL
CHOLEST/HDLC SERPL: 6 RATIO
CO2 SERPL-SCNC: 26 MMOL/L
COLOR: YELLOW
CREAT SERPL-MCNC: 0.8 MG/DL
EOSINOPHIL # BLD AUTO: 0.69 K/UL
EOSINOPHIL NFR BLD AUTO: 7.8 %
GLUCOSE QUALITATIVE U: NEGATIVE MG/DL
GLUCOSE SERPL-MCNC: 90 MG/DL
HBA1C MFR BLD HPLC: 5.2 %
HCT VFR BLD CALC: 46.6 %
HCV AB SER QL: NONREACTIVE
HCV S/CO RATIO: 0.12 S/CO
HDLC SERPL-MCNC: 30 MG/DL
HGB BLD-MCNC: 15.5 G/DL
HIV1+2 AB SPEC QL IA.RAPID: NONREACTIVE
IMM GRANULOCYTES NFR BLD AUTO: 0.1 %
KETONES URINE: NEGATIVE
LDLC SERPL CALC-MCNC: 128 MG/DL
LEUKOCYTE ESTERASE URINE: NEGATIVE
LYMPHOCYTES # BLD AUTO: 1.23 K/UL
LYMPHOCYTES NFR BLD AUTO: 13.9 %
MAN DIFF?: NORMAL
MCHC RBC-ENTMCNC: 30.3 PG
MCHC RBC-ENTMCNC: 33.3 GM/DL
MCV RBC AUTO: 91 FL
MONOCYTES # BLD AUTO: 0.44 K/UL
MONOCYTES NFR BLD AUTO: 5 %
N GONORRHOEA RRNA SPEC QL NAA+PROBE: NOT DETECTED
NEUTROPHILS # BLD AUTO: 6.41 K/UL
NEUTROPHILS NFR BLD AUTO: 72.4 %
NITRITE URINE: NEGATIVE
PH URINE: 6
PLATELET # BLD AUTO: 229 K/UL
POTASSIUM SERPL-SCNC: 4.2 MMOL/L
PROT SERPL-MCNC: 7.5 G/DL
PROTEIN URINE: NEGATIVE MG/DL
PSA FREE FLD-MCNC: 31.1
PSA FREE SERPL-MCNC: 0.32 NG/ML
PSA SERPL-MCNC: 1.03 NG/ML
RBC # BLD: 5.12 M/UL
RBC # FLD: 13.6 %
SODIUM SERPL-SCNC: 139 MMOL/L
SOURCE AMPLIFICATION: NORMAL
SPECIFIC GRAVITY URINE: 1.01
T PALLIDUM AB SER QL IA: NEGATIVE
TRIGL SERPL-MCNC: 103 MG/DL
TSH SERPL-ACNC: 0.78 UIU/ML
UROBILINOGEN URINE: NEGATIVE MG/DL
WBC # FLD AUTO: 8.85 K/UL

## 2017-12-19 ENCOUNTER — APPOINTMENT (OUTPATIENT)
Dept: GASTROENTEROLOGY | Facility: HOSPITAL | Age: 47
End: 2017-12-19

## 2018-02-26 ENCOUNTER — APPOINTMENT (OUTPATIENT)
Dept: OTOLARYNGOLOGY | Facility: CLINIC | Age: 48
End: 2018-02-26
Payer: MEDICAID

## 2018-02-26 VITALS
WEIGHT: 160 LBS | BODY MASS INDEX: 21.67 KG/M2 | DIASTOLIC BLOOD PRESSURE: 77 MMHG | HEIGHT: 72 IN | SYSTOLIC BLOOD PRESSURE: 116 MMHG

## 2018-02-26 PROCEDURE — 99204 OFFICE O/P NEW MOD 45 MIN: CPT | Mod: 25

## 2018-02-26 PROCEDURE — 69210 REMOVE IMPACTED EAR WAX UNI: CPT

## 2018-02-28 ENCOUNTER — APPOINTMENT (OUTPATIENT)
Dept: OPHTHALMOLOGY | Facility: CLINIC | Age: 48
End: 2018-02-28

## 2018-03-13 ENCOUNTER — APPOINTMENT (OUTPATIENT)
Dept: OTOLARYNGOLOGY | Facility: CLINIC | Age: 48
End: 2018-03-13

## 2018-04-01 ENCOUNTER — OUTPATIENT (OUTPATIENT)
Dept: OUTPATIENT SERVICES | Facility: HOSPITAL | Age: 48
LOS: 1 days | End: 2018-04-01
Payer: MEDICAID

## 2018-04-01 PROCEDURE — G9001: CPT

## 2018-04-18 DIAGNOSIS — R69 ILLNESS, UNSPECIFIED: ICD-10-CM

## 2018-05-08 ENCOUNTER — OUTPATIENT (OUTPATIENT)
Dept: OUTPATIENT SERVICES | Facility: HOSPITAL | Age: 48
LOS: 1 days | End: 2018-05-08
Payer: MEDICAID

## 2018-05-08 ENCOUNTER — APPOINTMENT (OUTPATIENT)
Dept: GASTROENTEROLOGY | Facility: HOSPITAL | Age: 48
End: 2018-05-08
Payer: MEDICAID

## 2018-05-08 VITALS
DIASTOLIC BLOOD PRESSURE: 77 MMHG | WEIGHT: 170 LBS | BODY MASS INDEX: 23.03 KG/M2 | HEART RATE: 74 BPM | RESPIRATION RATE: 14 BRPM | SYSTOLIC BLOOD PRESSURE: 122 MMHG | HEIGHT: 72 IN

## 2018-05-08 DIAGNOSIS — Z12.11 ENCOUNTER FOR SCREENING FOR MALIGNANT NEOPLASM OF COLON: ICD-10-CM

## 2018-05-08 DIAGNOSIS — R10.9 UNSPECIFIED ABDOMINAL PAIN: ICD-10-CM

## 2018-05-08 PROCEDURE — 99203 OFFICE O/P NEW LOW 30 MIN: CPT | Mod: GC

## 2018-05-08 PROCEDURE — G0463: CPT

## 2018-06-05 ENCOUNTER — OTHER (OUTPATIENT)
Age: 48
End: 2018-06-05

## 2018-06-07 ENCOUNTER — OUTPATIENT (OUTPATIENT)
Dept: OUTPATIENT SERVICES | Facility: HOSPITAL | Age: 48
LOS: 1 days | End: 2018-06-07
Payer: MEDICAID

## 2018-06-07 DIAGNOSIS — F10.21 ALCOHOL DEPENDENCE, IN REMISSION: ICD-10-CM

## 2018-06-07 DIAGNOSIS — Z87.898 PERSONAL HISTORY OF OTHER SPECIFIED CONDITIONS: ICD-10-CM

## 2018-06-07 PROCEDURE — 45378 DIAGNOSTIC COLONOSCOPY: CPT | Mod: 52,GC

## 2018-06-07 PROCEDURE — G0121: CPT

## 2018-06-21 ENCOUNTER — OTHER (OUTPATIENT)
Age: 48
End: 2018-06-21

## 2018-08-01 ENCOUNTER — OUTPATIENT (OUTPATIENT)
Dept: OUTPATIENT SERVICES | Facility: HOSPITAL | Age: 48
LOS: 1 days | End: 2018-08-01
Payer: MEDICAID

## 2018-08-01 ENCOUNTER — RESULT REVIEW (OUTPATIENT)
Age: 48
End: 2018-08-01

## 2018-08-01 DIAGNOSIS — Z12.12 ENCOUNTER FOR SCREENING FOR MALIGNANT NEOPLASM OF RECTUM: ICD-10-CM

## 2018-08-01 DIAGNOSIS — Z12.11 ENCOUNTER FOR SCREENING FOR MALIGNANT NEOPLASM OF COLON: ICD-10-CM

## 2018-08-01 PROCEDURE — 88342 IMHCHEM/IMCYTCHM 1ST ANTB: CPT

## 2018-08-01 PROCEDURE — 88341 IMHCHEM/IMCYTCHM EA ADD ANTB: CPT

## 2018-08-01 PROCEDURE — 45385 COLONOSCOPY W/LESION REMOVAL: CPT | Mod: GC

## 2018-08-01 PROCEDURE — 45380 COLONOSCOPY AND BIOPSY: CPT | Mod: PT,XS

## 2018-08-01 PROCEDURE — C1889: CPT

## 2018-08-01 PROCEDURE — 88342 IMHCHEM/IMCYTCHM 1ST ANTB: CPT | Mod: 26

## 2018-08-01 PROCEDURE — 45385 COLONOSCOPY W/LESION REMOVAL: CPT | Mod: PT

## 2018-08-01 PROCEDURE — 88341 IMHCHEM/IMCYTCHM EA ADD ANTB: CPT | Mod: 26

## 2018-08-01 PROCEDURE — 88305 TISSUE EXAM BY PATHOLOGIST: CPT

## 2018-08-01 PROCEDURE — 88305 TISSUE EXAM BY PATHOLOGIST: CPT | Mod: 26

## 2018-08-02 ENCOUNTER — RX RENEWAL (OUTPATIENT)
Age: 48
End: 2018-08-02

## 2018-08-06 LAB — SURGICAL PATHOLOGY STUDY: SIGNIFICANT CHANGE UP

## 2018-08-07 ENCOUNTER — MESSAGE (OUTPATIENT)
Age: 48
End: 2018-08-07

## 2018-08-21 ENCOUNTER — OUTPATIENT (OUTPATIENT)
Dept: OUTPATIENT SERVICES | Facility: HOSPITAL | Age: 48
LOS: 1 days | End: 2018-08-21
Payer: MEDICAID

## 2018-08-21 ENCOUNTER — APPOINTMENT (OUTPATIENT)
Dept: GASTROENTEROLOGY | Facility: HOSPITAL | Age: 48
End: 2018-08-21
Payer: MEDICAID

## 2018-08-21 VITALS
WEIGHT: 160 LBS | HEIGHT: 72 IN | DIASTOLIC BLOOD PRESSURE: 73 MMHG | HEART RATE: 68 BPM | SYSTOLIC BLOOD PRESSURE: 101 MMHG | BODY MASS INDEX: 21.67 KG/M2

## 2018-08-21 DIAGNOSIS — D36.9 BENIGN NEOPLASM, UNSPECIFIED SITE: ICD-10-CM

## 2018-08-21 DIAGNOSIS — K31.9 DISEASE OF STOMACH AND DUODENUM, UNSPECIFIED: ICD-10-CM

## 2018-08-21 DIAGNOSIS — D12.6 BENIGN NEOPLASM OF COLON, UNSPECIFIED: ICD-10-CM

## 2018-08-21 DIAGNOSIS — Z72.0 TOBACCO USE: ICD-10-CM

## 2018-08-21 DIAGNOSIS — Z86.59 PERSONAL HISTORY OF OTHER MENTAL AND BEHAVIORAL DISORDERS: ICD-10-CM

## 2018-08-21 PROCEDURE — G0463: CPT

## 2018-08-21 PROCEDURE — 36415 COLL VENOUS BLD VENIPUNCTURE: CPT

## 2018-08-21 PROCEDURE — 99213 OFFICE O/P EST LOW 20 MIN: CPT | Mod: GC

## 2018-08-21 RX ORDER — POLYETHYLENE GLYCOL 3350 AND ELECTROLYTES WITH LEMON FLAVOR 236; 22.74; 6.74; 5.86; 2.97 G/4L; G/4L; G/4L; G/4L; G/4L
236 POWDER, FOR SOLUTION ORAL
Qty: 1 | Refills: 0 | Status: DISCONTINUED | COMMUNITY
Start: 2018-06-11 | End: 2018-08-21

## 2018-08-21 RX ORDER — POLYETHYLENE GLYCOL 3350 AND ELECTROLYTES WITH LEMON FLAVOR 236; 22.74; 6.74; 5.86; 2.97 G/4L; G/4L; G/4L; G/4L; G/4L
236 POWDER, FOR SOLUTION ORAL
Qty: 1 | Refills: 0 | Status: DISCONTINUED | COMMUNITY
Start: 2018-06-06 | End: 2018-08-21

## 2018-08-21 RX ORDER — POLYETHYLENE GLYCOL 3350 AND ELECTROLYTES WITH LEMON FLAVOR 236; 22.74; 6.74; 5.86; 2.97 G/4L; G/4L; G/4L; G/4L; G/4L
236 POWDER, FOR SOLUTION ORAL
Qty: 1 | Refills: 0 | Status: DISCONTINUED | COMMUNITY
Start: 2018-05-08 | End: 2018-08-21

## 2018-08-21 RX ORDER — POLYETHYLENE GLYCOL 3350 AND ELECTROLYTES WITH LEMON FLAVOR 236; 22.74; 6.74; 5.86; 2.97 G/4L; G/4L; G/4L; G/4L; G/4L
236 POWDER, FOR SOLUTION ORAL
Qty: 1 | Refills: 0 | Status: DISCONTINUED | COMMUNITY
Start: 2018-06-05 | End: 2018-08-21

## 2018-08-21 RX ORDER — POLYETHYLENE GLYCOL 3350 AND ELECTROLYTES WITH LEMON FLAVOR 236; 22.74; 6.74; 5.86; 2.97 G/4L; G/4L; G/4L; G/4L; G/4L
236 POWDER, FOR SOLUTION ORAL
Qty: 1 | Refills: 0 | Status: DISCONTINUED | COMMUNITY
Start: 2018-08-02 | End: 2018-08-21

## 2018-11-20 ENCOUNTER — APPOINTMENT (OUTPATIENT)
Dept: GASTROENTEROLOGY | Facility: HOSPITAL | Age: 48
End: 2018-11-20

## 2018-12-17 ENCOUNTER — APPOINTMENT (OUTPATIENT)
Dept: INTERNAL MEDICINE | Facility: CLINIC | Age: 48
End: 2018-12-17
Payer: MEDICARE

## 2018-12-17 ENCOUNTER — NON-APPOINTMENT (OUTPATIENT)
Age: 48
End: 2018-12-17

## 2018-12-17 VITALS
OXYGEN SATURATION: 98 % | HEART RATE: 73 BPM | DIASTOLIC BLOOD PRESSURE: 87 MMHG | SYSTOLIC BLOOD PRESSURE: 127 MMHG | WEIGHT: 170 LBS | BODY MASS INDEX: 23.03 KG/M2 | TEMPERATURE: 97.7 F | HEIGHT: 72 IN | RESPIRATION RATE: 15 BRPM

## 2018-12-17 PROCEDURE — 93000 ELECTROCARDIOGRAM COMPLETE: CPT

## 2018-12-17 PROCEDURE — G0402 INITIAL PREVENTIVE EXAM: CPT

## 2018-12-17 PROCEDURE — G0439: CPT

## 2018-12-17 RX ORDER — NALTREXONE 200; 6.5 MG/1; MG/1
200-6.5 IMPLANT SUBCUTANEOUS
Refills: 0 | Status: ACTIVE | COMMUNITY
Start: 2018-12-17

## 2018-12-17 RX ORDER — HALOPERIDOL DECANOATE 100 MG/ML
100 INJECTION INTRAMUSCULAR
Refills: 0 | Status: ACTIVE | COMMUNITY
Start: 2018-12-17

## 2018-12-17 RX ORDER — BENZTROPINE MESYLATE 0.5 MG/1
0.5 TABLET ORAL
Refills: 0 | Status: ACTIVE | COMMUNITY
Start: 2018-12-17

## 2018-12-17 RX ORDER — SERTRALINE HYDROCHLORIDE 100 MG/1
100 TABLET, FILM COATED ORAL
Refills: 0 | Status: ACTIVE | COMMUNITY
Start: 2018-12-17

## 2018-12-17 NOTE — PHYSICAL EXAM
[General Appearance - Alert] : alert [General Appearance - In No Acute Distress] : in no acute distress [Sclera] : the sclera and conjunctiva were normal [PERRL With Normal Accommodation] : pupils were equal in size, round, and reactive to light [Extraocular Movements] : extraocular movements were intact [Oropharynx] : the oropharynx was normal [Neck Appearance] : the appearance of the neck was normal [Neck Cervical Mass (___cm)] : no neck mass was observed [Jugular Venous Distention Increased] : there was no jugular-venous distention [Thyroid Diffuse Enlargement] : the thyroid was not enlarged [Thyroid Nodule] : there were no palpable thyroid nodules [Auscultation Breath Sounds / Voice Sounds] : lungs were clear to auscultation bilaterally [Heart Rate And Rhythm] : heart rate was normal and rhythm regular [Heart Sounds] : normal S1 and S2 [Murmurs] : no murmurs [Full Pulse] : the pedal pulses are present [Edema] : there was no peripheral edema [Bowel Sounds] : normal bowel sounds [Abdomen Soft] : soft [Abdomen Tenderness] : non-tender [Cervical Lymph Nodes Enlarged Posterior Bilaterally] : posterior cervical [Cervical Lymph Nodes Enlarged Anterior Bilaterally] : anterior cervical [Supraclavicular Lymph Nodes Enlarged Bilaterally] : supraclavicular [No CVA Tenderness] : no ~M costovertebral angle tenderness [No Spinal Tenderness] : no spinal tenderness [Abnormal Walk] : normal gait [Nail Clubbing] : no clubbing  or cyanosis of the fingernails [Musculoskeletal - Swelling] : no joint swelling seen [Motor Tone] : muscle strength and tone were normal [Skin Color & Pigmentation] : normal skin color and pigmentation [Skin Turgor] : normal skin turgor [] : no rash [Cranial Nerves] : cranial nerves 2-12 were intact [Sensation] : the sensory exam was normal to light touch and pinprick [No Focal Deficits] : no focal deficits [Oriented To Time, Place, And Person] : oriented to person, place, and time [Impaired Insight] : insight and judgment were intact [Affect] : the affect was normal [FreeTextEntry1] : multiple tattoos

## 2018-12-17 NOTE — ASSESSMENT
[FreeTextEntry1] : Assessment/Plan:\par Patient is a 48 yo male presents for annual physical.  He has an unclear psych history for which he f/u at outpatient ACT psych center. He has a center form with quantiferom gold screen for TB\par \par EKG reviewed.\par \par Referral were given for ophthalmology, ENT and GI\par \par Smoking Cessation: Smokers should quit: this increases risk for lung disease and cancers, early death.  \par Brief counseling - Age appropriate preventative care counseling/HCM discussed including, but not limited to proper nutrition, regular exercise, routine dental and eye care, sunscreen/skin cancer prevention, and healthy eating & exercise.\par Patient will repeat colonoscopy in Jan/Feb 2019. Referral was given.\par Labs to be done:  UA, CBC, Lipids, BMP/blood glucose, TSH, Vitamin D, HbA1C, PSA, quantiferon\par All questions were answered. Patient understands and is in agreement with the plan of care.\par Annual Well Visit: Recommended 1 year.\par

## 2018-12-17 NOTE — HEALTH RISK ASSESSMENT
[Good] : ~his/her~  mood as  good [0] : 1) Little interest or pleasure doing things: Not at all (0) [1] : 2) Feeling down, depressed, or hopeless for several days (1) [HIV test declined] : HIV test declined [Hepatitis C test declined] : Hepatitis C test declined [FreeTextEntry1] : doing well on current meds [ColonoscopyDate] : 8/18 [ColonoscopyComments] : multiple polyps; will f/u 6mos

## 2018-12-17 NOTE — HISTORY OF PRESENT ILLNESS
[FreeTextEntry1] : History of Present Illness:  This patient is a 47 yo male here for his health maintenance evaluation. \par \par He has a psych history-- Bipolar vs schizoaffective vs other (on Haldol), depression/anxiety f/u psychiatry and lives in a facility with a roommate.\par Currently he f/u with [facility] team (psych center), [employer]- he sees her monthly\par lives at [facility] [address]\par \par Significant PMH: Schizoaffective  vs Bipolar vs other , depression/ anxiety f/u psychiatry\par Surgical Hx: none\par Family Hx: mother-  diabetes, father-  from diabetes\par Allergies: NKDA\par Meds: Haldol 100 IM monthly, sertraline 100, naltrexone IM, benztropine .5 bid\par \par Brief Social History: Unemployed, disability\par Living situation:  Lives in [facility] residence\par Tobacco Use: 1/2 ppd x 20 years, cessation discussed; has nicotine inhaler\par EtOH/Drug use: Denies drugs/stopped EtOH, stooped > 1 year ago\par \par Immunization:\par Flu: declined\par \par Screening:\par Colon CA Screening: done 2018- multipel polyps; has repeat in 6 mos-reminded to scheduel\par Prostate CA screening: done today\par HIV : consents to HIV testing today \par Depression screening: PHQ-2 screen is negative - see above, controlled on medication\par Vision/Dental: referral to ophto given\par

## 2018-12-19 LAB
25(OH)D3 SERPL-MCNC: 19.5 NG/ML
ALBUMIN SERPL ELPH-MCNC: 4.1 G/DL
ALP BLD-CCNC: 81 U/L
ALT SERPL-CCNC: 10 U/L
ANION GAP SERPL CALC-SCNC: 9 MMOL/L
APPEARANCE: CLEAR
AST SERPL-CCNC: 16 U/L
BASOPHILS # BLD AUTO: 0.07 K/UL
BASOPHILS NFR BLD AUTO: 0.8 %
BILIRUB SERPL-MCNC: 0.4 MG/DL
BILIRUBIN URINE: NEGATIVE
BLOOD URINE: NEGATIVE
BUN SERPL-MCNC: 13 MG/DL
CALCIUM SERPL-MCNC: 9 MG/DL
CHLORIDE SERPL-SCNC: 102 MMOL/L
CHOLEST SERPL-MCNC: 148 MG/DL
CHOLEST/HDLC SERPL: 4.5 RATIO
CO2 SERPL-SCNC: 28 MMOL/L
COLOR: YELLOW
CREAT SERPL-MCNC: 0.8 MG/DL
EOSINOPHIL # BLD AUTO: 0.71 K/UL
EOSINOPHIL NFR BLD AUTO: 7.7 %
GLUCOSE QUALITATIVE U: NEGATIVE MG/DL
GLUCOSE SERPL-MCNC: 66 MG/DL
HCT VFR BLD CALC: 48.7 %
HDLC SERPL-MCNC: 33 MG/DL
HGB BLD-MCNC: 16 G/DL
IMM GRANULOCYTES NFR BLD AUTO: 0.1 %
KETONES URINE: NEGATIVE
LDLC SERPL CALC-MCNC: 101 MG/DL
LEUKOCYTE ESTERASE URINE: NEGATIVE
LYMPHOCYTES # BLD AUTO: 1.43 K/UL
LYMPHOCYTES NFR BLD AUTO: 15.6 %
M TB IFN-G BLD-IMP: NEGATIVE
MAN DIFF?: NORMAL
MCHC RBC-ENTMCNC: 32.1 PG
MCHC RBC-ENTMCNC: 32.9 GM/DL
MCV RBC AUTO: 97.6 FL
MONOCYTES # BLD AUTO: 0.32 K/UL
MONOCYTES NFR BLD AUTO: 3.5 %
NEUTROPHILS # BLD AUTO: 6.63 K/UL
NEUTROPHILS NFR BLD AUTO: 72.3 %
NITRITE URINE: NEGATIVE
PH URINE: 6.5
PLATELET # BLD AUTO: 201 K/UL
POTASSIUM SERPL-SCNC: 4.4 MMOL/L
PROT SERPL-MCNC: 7.2 G/DL
PROTEIN URINE: NEGATIVE MG/DL
PSA FREE FLD-MCNC: 38 %
PSA FREE SERPL-MCNC: 0.34 NG/ML
PSA SERPL-MCNC: 0.89 NG/ML
QUANTIFERON TB PLUS MITOGEN MINUS NIL: 3.4 IU/ML
QUANTIFERON TB PLUS NIL: 0.02 IU/ML
QUANTIFERON TB PLUS TB1 MINUS NIL: 0 IU/ML
QUANTIFERON TB PLUS TB2 MINUS NIL: 0 IU/ML
RBC # BLD: 4.99 M/UL
RBC # FLD: 14.1 %
SODIUM SERPL-SCNC: 139 MMOL/L
SPECIFIC GRAVITY URINE: 1.01
TRIGL SERPL-MCNC: 71 MG/DL
TSH SERPL-ACNC: 0.94 UIU/ML
UROBILINOGEN URINE: NEGATIVE MG/DL
WBC # FLD AUTO: 9.17 K/UL

## 2019-03-21 ENCOUNTER — APPOINTMENT (OUTPATIENT)
Dept: INTERNAL MEDICINE | Facility: CLINIC | Age: 49
End: 2019-03-21
Payer: MEDICARE

## 2019-03-21 VITALS
SYSTOLIC BLOOD PRESSURE: 124 MMHG | OXYGEN SATURATION: 99 % | BODY MASS INDEX: 22.62 KG/M2 | TEMPERATURE: 98.1 F | WEIGHT: 167 LBS | DIASTOLIC BLOOD PRESSURE: 80 MMHG | HEART RATE: 77 BPM | RESPIRATION RATE: 17 BRPM | HEIGHT: 72 IN

## 2019-03-21 PROCEDURE — 99214 OFFICE O/P EST MOD 30 MIN: CPT

## 2019-04-22 ENCOUNTER — EMERGENCY (EMERGENCY)
Facility: HOSPITAL | Age: 49
LOS: 1 days | Discharge: HOME | End: 2019-04-22
Attending: EMERGENCY MEDICINE | Admitting: EMERGENCY MEDICINE
Payer: MEDICARE

## 2019-04-22 VITALS
OXYGEN SATURATION: 98 % | SYSTOLIC BLOOD PRESSURE: 114 MMHG | HEART RATE: 69 BPM | RESPIRATION RATE: 17 BRPM | TEMPERATURE: 98 F | DIASTOLIC BLOOD PRESSURE: 72 MMHG | WEIGHT: 160.06 LBS

## 2019-04-22 VITALS
SYSTOLIC BLOOD PRESSURE: 121 MMHG | OXYGEN SATURATION: 99 % | DIASTOLIC BLOOD PRESSURE: 73 MMHG | HEART RATE: 68 BPM | RESPIRATION RATE: 16 BRPM

## 2019-04-22 DIAGNOSIS — K92.0 HEMATEMESIS: ICD-10-CM

## 2019-04-22 LAB
ALBUMIN SERPL ELPH-MCNC: 3.5 G/DL — SIGNIFICANT CHANGE UP (ref 3.3–5)
ALP SERPL-CCNC: 78 U/L — SIGNIFICANT CHANGE UP (ref 40–120)
ALT FLD-CCNC: 15 U/L DA — SIGNIFICANT CHANGE UP (ref 10–45)
ANION GAP SERPL CALC-SCNC: 8 MMOL/L — SIGNIFICANT CHANGE UP (ref 5–17)
APPEARANCE UR: CLEAR — SIGNIFICANT CHANGE UP
APTT BLD: 27 SEC — LOW (ref 27.5–36.3)
AST SERPL-CCNC: 16 U/L — SIGNIFICANT CHANGE UP (ref 10–40)
BASOPHILS # BLD AUTO: 0.2 K/UL — SIGNIFICANT CHANGE UP (ref 0–0.2)
BASOPHILS NFR BLD AUTO: 2.3 % — HIGH (ref 0–2)
BILIRUB SERPL-MCNC: 0.5 MG/DL — SIGNIFICANT CHANGE UP (ref 0.2–1.2)
BILIRUB UR-MCNC: NEGATIVE — SIGNIFICANT CHANGE UP
BUN SERPL-MCNC: 9 MG/DL — SIGNIFICANT CHANGE UP (ref 7–23)
CALCIUM SERPL-MCNC: 8.7 MG/DL — SIGNIFICANT CHANGE UP (ref 8.4–10.5)
CHLORIDE SERPL-SCNC: 103 MMOL/L — SIGNIFICANT CHANGE UP (ref 96–108)
CO2 SERPL-SCNC: 27 MMOL/L — SIGNIFICANT CHANGE UP (ref 22–31)
COLOR SPEC: YELLOW — SIGNIFICANT CHANGE UP
CREAT SERPL-MCNC: 0.73 MG/DL — SIGNIFICANT CHANGE UP (ref 0.5–1.3)
DIFF PNL FLD: NEGATIVE — SIGNIFICANT CHANGE UP
EOSINOPHIL # BLD AUTO: 0.4 K/UL — SIGNIFICANT CHANGE UP (ref 0–0.5)
EOSINOPHIL NFR BLD AUTO: 3.9 % — SIGNIFICANT CHANGE UP (ref 0–6)
GLUCOSE SERPL-MCNC: 109 MG/DL — HIGH (ref 70–99)
GLUCOSE UR QL: NEGATIVE — SIGNIFICANT CHANGE UP
HCT VFR BLD CALC: 46.8 % — SIGNIFICANT CHANGE UP (ref 39–50)
HGB BLD-MCNC: 16 G/DL — SIGNIFICANT CHANGE UP (ref 13–17)
INR BLD: 1.07 RATIO — SIGNIFICANT CHANGE UP (ref 0.88–1.16)
KETONES UR-MCNC: NEGATIVE — SIGNIFICANT CHANGE UP
LEUKOCYTE ESTERASE UR-ACNC: NEGATIVE — SIGNIFICANT CHANGE UP
LIDOCAIN IGE QN: 82 U/L — SIGNIFICANT CHANGE UP (ref 73–393)
LYMPHOCYTES # BLD AUTO: 1.1 K/UL — SIGNIFICANT CHANGE UP (ref 1–3.3)
LYMPHOCYTES # BLD AUTO: 11 % — LOW (ref 13–44)
MCHC RBC-ENTMCNC: 32.9 PG — SIGNIFICANT CHANGE UP (ref 27–34)
MCHC RBC-ENTMCNC: 34.3 GM/DL — SIGNIFICANT CHANGE UP (ref 32–36)
MCV RBC AUTO: 96 FL — SIGNIFICANT CHANGE UP (ref 80–100)
MONOCYTES # BLD AUTO: 0.5 K/UL — SIGNIFICANT CHANGE UP (ref 0–0.9)
MONOCYTES NFR BLD AUTO: 5.5 % — SIGNIFICANT CHANGE UP (ref 2–14)
NEUTROPHILS # BLD AUTO: 7.5 K/UL — HIGH (ref 1.8–7.4)
NEUTROPHILS NFR BLD AUTO: 77.3 % — HIGH (ref 43–77)
NITRITE UR-MCNC: NEGATIVE — SIGNIFICANT CHANGE UP
PH UR: 7 — SIGNIFICANT CHANGE UP (ref 5–8)
PLATELET # BLD AUTO: 172 K/UL — SIGNIFICANT CHANGE UP (ref 150–400)
POTASSIUM SERPL-MCNC: 3.9 MMOL/L — SIGNIFICANT CHANGE UP (ref 3.5–5.3)
POTASSIUM SERPL-SCNC: 3.9 MMOL/L — SIGNIFICANT CHANGE UP (ref 3.5–5.3)
PROT SERPL-MCNC: 7 G/DL — SIGNIFICANT CHANGE UP (ref 6–8.3)
PROT UR-MCNC: NEGATIVE — SIGNIFICANT CHANGE UP
PROTHROM AB SERPL-ACNC: 12 SEC — SIGNIFICANT CHANGE UP (ref 10–12.9)
RBC # BLD: 4.88 M/UL — SIGNIFICANT CHANGE UP (ref 4.2–5.8)
RBC # FLD: 12.4 % — SIGNIFICANT CHANGE UP (ref 10.3–14.5)
SODIUM SERPL-SCNC: 138 MMOL/L — SIGNIFICANT CHANGE UP (ref 135–145)
SP GR SPEC: 1 — LOW (ref 1.01–1.02)
UROBILINOGEN FLD QL: NEGATIVE — SIGNIFICANT CHANGE UP
WBC # BLD: 9.7 K/UL — SIGNIFICANT CHANGE UP (ref 3.8–10.5)
WBC # FLD AUTO: 9.7 K/UL — SIGNIFICANT CHANGE UP (ref 3.8–10.5)

## 2019-04-22 PROCEDURE — 96361 HYDRATE IV INFUSION ADD-ON: CPT

## 2019-04-22 PROCEDURE — 85610 PROTHROMBIN TIME: CPT

## 2019-04-22 PROCEDURE — 99284 EMERGENCY DEPT VISIT MOD MDM: CPT

## 2019-04-22 PROCEDURE — 99284 EMERGENCY DEPT VISIT MOD MDM: CPT | Mod: 25

## 2019-04-22 PROCEDURE — 83690 ASSAY OF LIPASE: CPT

## 2019-04-22 PROCEDURE — 85730 THROMBOPLASTIN TIME PARTIAL: CPT

## 2019-04-22 PROCEDURE — 85027 COMPLETE CBC AUTOMATED: CPT

## 2019-04-22 PROCEDURE — 74177 CT ABD & PELVIS W/CONTRAST: CPT

## 2019-04-22 PROCEDURE — 87086 URINE CULTURE/COLONY COUNT: CPT

## 2019-04-22 PROCEDURE — 96374 THER/PROPH/DIAG INJ IV PUSH: CPT | Mod: XU

## 2019-04-22 PROCEDURE — 36415 COLL VENOUS BLD VENIPUNCTURE: CPT

## 2019-04-22 PROCEDURE — 80053 COMPREHEN METABOLIC PANEL: CPT

## 2019-04-22 PROCEDURE — 96375 TX/PRO/DX INJ NEW DRUG ADDON: CPT

## 2019-04-22 PROCEDURE — 74177 CT ABD & PELVIS W/CONTRAST: CPT | Mod: 26

## 2019-04-22 RX ORDER — PANTOPRAZOLE SODIUM 20 MG/1
40 TABLET, DELAYED RELEASE ORAL ONCE
Qty: 0 | Refills: 0 | Status: COMPLETED | OUTPATIENT
Start: 2019-04-22 | End: 2019-04-22

## 2019-04-22 RX ORDER — SODIUM CHLORIDE 9 MG/ML
1000 INJECTION INTRAMUSCULAR; INTRAVENOUS; SUBCUTANEOUS ONCE
Qty: 0 | Refills: 0 | Status: COMPLETED | OUTPATIENT
Start: 2019-04-22 | End: 2019-04-22

## 2019-04-22 RX ORDER — ONDANSETRON 8 MG/1
4 TABLET, FILM COATED ORAL ONCE
Qty: 0 | Refills: 0 | Status: COMPLETED | OUTPATIENT
Start: 2019-04-22 | End: 2019-04-22

## 2019-04-22 RX ORDER — SUCRALFATE 1 G
1 TABLET ORAL ONCE
Qty: 0 | Refills: 0 | Status: COMPLETED | OUTPATIENT
Start: 2019-04-22 | End: 2019-04-22

## 2019-04-22 RX ORDER — METOCLOPRAMIDE HCL 10 MG
10 TABLET ORAL ONCE
Qty: 0 | Refills: 0 | Status: COMPLETED | OUTPATIENT
Start: 2019-04-22 | End: 2019-04-22

## 2019-04-22 RX ADMIN — SODIUM CHLORIDE 1000 MILLILITER(S): 9 INJECTION INTRAMUSCULAR; INTRAVENOUS; SUBCUTANEOUS at 18:59

## 2019-04-22 RX ADMIN — ONDANSETRON 4 MILLIGRAM(S): 8 TABLET, FILM COATED ORAL at 17:59

## 2019-04-22 RX ADMIN — Medication 104 MILLIGRAM(S): at 22:34

## 2019-04-22 RX ADMIN — SODIUM CHLORIDE 1000 MILLILITER(S): 9 INJECTION INTRAMUSCULAR; INTRAVENOUS; SUBCUTANEOUS at 17:59

## 2019-04-22 RX ADMIN — Medication 1 GRAM(S): at 22:35

## 2019-04-22 RX ADMIN — PANTOPRAZOLE SODIUM 40 MILLIGRAM(S): 20 TABLET, DELAYED RELEASE ORAL at 22:34

## 2019-04-22 NOTE — ED PROVIDER NOTE - OBJECTIVE STATEMENT
48 yr old male with hx of ETOH abuse, schizophrenia presents from [facility] due to abdominal pain and 1 episode of vomiting blood today. Pt reports pain started this morning, generalized. Denies any fever, chills, diarrhea, urinary complaints or any other symptoms.

## 2019-04-22 NOTE — ED PROVIDER NOTE - PROGRESS NOTE DETAILS
DANK [place]: Pt signed out to DANK Al. Plan to follow up ct and re eval. PEARL Zamora, , will see pt for fu in the office. CT jenelle, SW Dr Zamora, , will see pt for fu in the office.

## 2019-04-22 NOTE — ED PROVIDER NOTE - ATTENDING CONTRIBUTION TO CARE
I personally evaluated the patient. I reviewed the Resident’s or Physician Assistant’s note (as assigned above), and agree with the findings and plan except as documented in my note.    Patient with diffuse abd pain / vomited x1    PE: no focal tenderness     CT NEG     follow up with Gi

## 2019-04-22 NOTE — ED PROVIDER NOTE - CLINICAL SUMMARY MEDICAL DECISION MAKING FREE TEXT BOX
48 yr old male with hx of ETOH abuse, schizophrenia presents from [facility] due to abdominal pain and 1 episode of vomiting blood today. Pt reports pain started this morning, generalized. Denies any fever, chills, diarrhea, urinary complaints or any other symptoms. 48 yr old male with hx of ETOH abuse, schizophrenia presents from [facility] due to abdominal pain and 1 episode of vomiting blood today. Pt reports pain started this morning, generalized. Denies any fever, chills, diarrhea, urinary complaints or any other symptoms. will check cbc, cmp, ua, uc, ct abd/ pelvis

## 2019-04-22 NOTE — ED ADULT NURSE NOTE - OBJECTIVE STATEMENT
pt BIB EMS from [facility] for abdominal pain and one episode of vomiting blood. pt rates abdominal pain as 10/10 currently. Pt denies any chest pain, SOB, blurred vision, headache, dizziness, fever, chills or any other complaint at this time.

## 2019-04-23 LAB
CULTURE RESULTS: NO GROWTH — SIGNIFICANT CHANGE UP
SPECIMEN SOURCE: SIGNIFICANT CHANGE UP

## 2019-05-07 ENCOUNTER — APPOINTMENT (OUTPATIENT)
Dept: GASTROENTEROLOGY | Facility: HOSPITAL | Age: 49
End: 2019-05-07

## 2019-05-07 ENCOUNTER — OUTPATIENT (OUTPATIENT)
Dept: OUTPATIENT SERVICES | Facility: HOSPITAL | Age: 49
LOS: 1 days | End: 2019-05-07
Payer: MEDICARE

## 2019-05-07 VITALS
SYSTOLIC BLOOD PRESSURE: 113 MMHG | RESPIRATION RATE: 14 BRPM | DIASTOLIC BLOOD PRESSURE: 76 MMHG | HEIGHT: 72 IN | WEIGHT: 167 LBS | BODY MASS INDEX: 22.62 KG/M2 | HEART RATE: 80 BPM

## 2019-05-07 DIAGNOSIS — K92.0 HEMATEMESIS: ICD-10-CM

## 2019-05-07 DIAGNOSIS — D12.6 BENIGN NEOPLASM OF COLON, UNSPECIFIED: ICD-10-CM

## 2019-05-07 DIAGNOSIS — Z86.69 PERSONAL HISTORY OF OTHER DISEASES OF THE NERVOUS SYSTEM AND SENSE ORGANS: ICD-10-CM

## 2019-05-07 DIAGNOSIS — Z12.12 ENCOUNTER FOR SCREENING FOR MALIGNANT NEOPLASM OF COLON: ICD-10-CM

## 2019-05-07 DIAGNOSIS — T16.1XXA FOREIGN BODY IN RIGHT EAR, INITIAL ENCOUNTER: ICD-10-CM

## 2019-05-07 DIAGNOSIS — K31.9 DISEASE OF STOMACH AND DUODENUM, UNSPECIFIED: ICD-10-CM

## 2019-05-07 DIAGNOSIS — Z12.11 ENCOUNTER FOR SCREENING FOR MALIGNANT NEOPLASM OF COLON: ICD-10-CM

## 2019-05-07 DIAGNOSIS — M79.18 MYALGIA, OTHER SITE: ICD-10-CM

## 2019-05-07 DIAGNOSIS — H92.03 OTALGIA, BILATERAL: ICD-10-CM

## 2019-05-07 LAB
ALBUMIN SERPL ELPH-MCNC: 4.2 G/DL — SIGNIFICANT CHANGE UP (ref 3.3–5)
ALP SERPL-CCNC: 76 U/L — SIGNIFICANT CHANGE UP (ref 40–120)
ALT FLD-CCNC: 10 U/L — SIGNIFICANT CHANGE UP (ref 10–45)
ANION GAP SERPL CALC-SCNC: 11 MMOL/L — SIGNIFICANT CHANGE UP (ref 5–17)
AST SERPL-CCNC: 13 U/L — SIGNIFICANT CHANGE UP (ref 10–40)
BASOPHILS # BLD AUTO: 0.08 K/UL — SIGNIFICANT CHANGE UP (ref 0–0.2)
BASOPHILS NFR BLD AUTO: 0.9 % — SIGNIFICANT CHANGE UP (ref 0–2)
BILIRUB SERPL-MCNC: 0.4 MG/DL — SIGNIFICANT CHANGE UP (ref 0.2–1.2)
BUN SERPL-MCNC: 11 MG/DL — SIGNIFICANT CHANGE UP (ref 7–23)
CALCIUM SERPL-MCNC: 9 MG/DL — SIGNIFICANT CHANGE UP (ref 8.4–10.5)
CHLORIDE SERPL-SCNC: 101 MMOL/L — SIGNIFICANT CHANGE UP (ref 96–108)
CO2 SERPL-SCNC: 27 MMOL/L — SIGNIFICANT CHANGE UP (ref 22–31)
CREAT SERPL-MCNC: 0.82 MG/DL — SIGNIFICANT CHANGE UP (ref 0.5–1.3)
EOSINOPHIL # BLD AUTO: 0.56 K/UL — HIGH (ref 0–0.5)
EOSINOPHIL NFR BLD AUTO: 6.3 % — HIGH (ref 0–6)
GLUCOSE SERPL-MCNC: 94 MG/DL — SIGNIFICANT CHANGE UP (ref 70–99)
HCT VFR BLD CALC: 49.8 % — SIGNIFICANT CHANGE UP (ref 39–50)
HGB BLD-MCNC: 16.7 G/DL — SIGNIFICANT CHANGE UP (ref 13–17)
IMM GRANULOCYTES NFR BLD AUTO: 0.2 % — SIGNIFICANT CHANGE UP (ref 0–1.5)
LYMPHOCYTES # BLD AUTO: 1.32 K/UL — SIGNIFICANT CHANGE UP (ref 1–3.3)
LYMPHOCYTES # BLD AUTO: 14.8 % — SIGNIFICANT CHANGE UP (ref 13–44)
MCHC RBC-ENTMCNC: 31.9 PG — SIGNIFICANT CHANGE UP (ref 27–34)
MCHC RBC-ENTMCNC: 33.5 GM/DL — SIGNIFICANT CHANGE UP (ref 32–36)
MCV RBC AUTO: 95 FL — SIGNIFICANT CHANGE UP (ref 80–100)
MONOCYTES # BLD AUTO: 0.47 K/UL — SIGNIFICANT CHANGE UP (ref 0–0.9)
MONOCYTES NFR BLD AUTO: 5.3 % — SIGNIFICANT CHANGE UP (ref 2–14)
NEUTROPHILS # BLD AUTO: 6.49 K/UL — SIGNIFICANT CHANGE UP (ref 1.8–7.4)
NEUTROPHILS NFR BLD AUTO: 72.5 % — SIGNIFICANT CHANGE UP (ref 43–77)
PLATELET # BLD AUTO: 185 K/UL — SIGNIFICANT CHANGE UP (ref 150–400)
POTASSIUM SERPL-MCNC: 4.3 MMOL/L — SIGNIFICANT CHANGE UP (ref 3.5–5.3)
POTASSIUM SERPL-SCNC: 4.3 MMOL/L — SIGNIFICANT CHANGE UP (ref 3.5–5.3)
PROT SERPL-MCNC: 7 G/DL — SIGNIFICANT CHANGE UP (ref 6–8.3)
RBC # BLD: 5.24 M/UL — SIGNIFICANT CHANGE UP (ref 4.2–5.8)
RBC # FLD: 13.2 % — SIGNIFICANT CHANGE UP (ref 10.3–14.5)
SODIUM SERPL-SCNC: 139 MMOL/L — SIGNIFICANT CHANGE UP (ref 135–145)
WBC # BLD: 8.94 K/UL — SIGNIFICANT CHANGE UP (ref 3.8–10.5)
WBC # FLD AUTO: 8.94 K/UL — SIGNIFICANT CHANGE UP (ref 3.8–10.5)

## 2019-05-07 PROCEDURE — G0463: CPT

## 2019-05-07 PROCEDURE — 80053 COMPREHEN METABOLIC PANEL: CPT

## 2019-05-07 PROCEDURE — 36415 COLL VENOUS BLD VENIPUNCTURE: CPT

## 2019-05-07 RX ORDER — POLYETHYLENE GLYCOL 3350 AND ELECTROLYTES WITH LEMON FLAVOR 236; 22.74; 6.74; 5.86; 2.97 G/4L; G/4L; G/4L; G/4L; G/4L
236 POWDER, FOR SOLUTION ORAL
Qty: 1 | Refills: 0 | Status: DISCONTINUED | COMMUNITY
Start: 2019-05-07 | End: 2019-05-07

## 2019-05-07 RX ORDER — BISACODYL 5 MG/1
5 TABLET ORAL
Qty: 4 | Refills: 0 | Status: DISCONTINUED | COMMUNITY
Start: 2019-05-07 | End: 2019-05-07

## 2019-05-07 RX ORDER — BISACODYL 5 MG/1
5 TABLET ORAL
Qty: 4 | Refills: 0 | Status: ACTIVE | COMMUNITY
Start: 2019-05-07 | End: 1900-01-01

## 2019-05-07 NOTE — HISTORY OF PRESENT ILLNESS
[___ Month(s) Ago] : [unfilled] month(s) ago [Heartburn] : denies heartburn [Vomiting] : denies vomiting [Nausea] : denies nausea [Diarrhea] : denies diarrhea [Constipation] : denies constipation [Yellow Skin Or Eyes (Jaundice)] : denies jaundice [Abdominal Pain] : denies abdominal pain [Abdominal Swelling] : denies abdominal swelling [Rectal Pain] : denies rectal pain [ER Visit] : was seen in the Emergency Department [_________] : Performed [unfilled] [de-identified] : 11 [de-identified] : Virgen syndrome panel (not completed)\par 1 month follow-up (Patient didn't follow-up) [de-identified] : 48 year old male with past medical history of bipolar disorder and current tobacco smoker presenting for follow-up after colonoscopy with multiple colon polyps in 8/2018. The patient underwent screening colonoscopy on August 1st, 2018 with removal of 13 polyps, 4 of which were found to be tubular adenomas and multiple serrated polyps. \par \par Mr. Hurley presented to the [facility] ER for a report of bloody emesis on 4/22/19. The patient states he vomited up "a little bit of blood," and became very anxious which prompted his presentation to the ER. He was found to have a Hgb of 16.0 and underwent a CT A/P with no acute pathology. He was discharged home the same day. Since discharge, the patient denies recurrent episodes of bloody emesis. He denies coffee ground emesis, bloody stools, and black tarry stools.\par \par He denies night-sweats. He otherwise denies nausea/vomiting, abdominal pain, diarrhea, constipation, difficultly swallowing, and painful swallowing. He denies family history of colorectal cancer, colon polyps, gastric cancer, and gastric polyps. \par  [de-identified] : [facility]\par ____________________________________________________________________________________________________\par Patient Name: Siddharth Hurley                     MRN: [id number]\par Account Number: [id number]                     YOB: 1970\par Room: Endoscopy Room 3                           Gender: Male\par Attending MD: Michelle Nicholson MD              Procedure Date No Time: 8/1/2018\par ____________________________________________________________________________________________________\par  \par Procedure:           Colonoscopy\par Indications:         Colorectal cancer screening.\par Providers:           Michelle Nicholson MD, Leila Kim (Fellow)\par Medicines:           Monitored Anesthesia Care\par Complications:       No immediate complications.\par ____________________________________________________________________________________________________\par Procedure:           Pre-Anesthesia Assessment:\par                      - Prior to the procedure, a History and Physical was performed, and patient \par                      medications and allergies were reviewed. The patient is competent. The risks \par                      and benefits of the procedure and the sedation options and risks were \par                      discussed with the patient. All questions were answered and informed consent \par                      was obtained. Patient identification and proposed procedure were verified by \par                      the physician, the nurse and the anesthetist in the endoscopy suite. \par                      Prophylactic Antibiotics: The patient does not require prophylactic \par                      antibiotics. Prior Anticoagulants: The patient has taken no previous \par                      anticoagulant or antiplatelet agents. ASA Grade Assessment: II - A patient \par                      with mild systemic disease. After reviewing the risks and benefits, the \par                      patient was deemed in satisfactory condition to undergo the procedure. The \par                      anesthesia plan was to use deep sedation / analgesia. Immediately prior to \par                      administration of medications, the patient was re-assessed for adequacy to \par                      receive sedatives. The heart rate, respiratory rate, oxygen saturations, \par                      blood pressure, adequacy of pulmonary ventilation, and response to care were \par                      monitored throughout the procedure. The physical status of the patient was \par                      re-assessed after the procedure.\par                      After I obtained informed consent, the scope was passed under direct vision. \par                      Throughout the procedure, the patient's blood pressure, pulse, and oxygen \par                      saturations were monitored continuously. The Colonoscope was introduced \par                      through the anus and advanced to the terminal ileum. The colonoscopy was \par                      performed without difficulty. The patient tolerated the procedure well. The \par                      quality of the bowel preparation was fair.\par                                                                                                     \par Findings:\par      The perianal exam findings include non-thrombosed external hemorrhoids.\par      A moderate amount of semi-liquid stool was found in the entire colon, making visualization \par      difficult. Lavage of the area was performed using copious amounts of sterile water, resulting \par      in clearance with adequate visualization to see large polyps or masses.\par      Two 3mm polyps were found in the cecum. The polyps were sessile. The polyps were removed with \par      a cold biopsy forceps. Resection and retrieval were complete.\par      A 3 mm polyp was found in the descending colon. The polyp was sessile. The polyp was removed \par      with a cold biopsy forceps. Resection and retrieval were complete.\par      A 1 cm polyp was found in the descending colon. The polyp was sessile. The polyp was removed \par      with a hot snare. Resection and retrieval were complete.\par      A 1 cm polyp was found in the descending colon. The polyp was sessile. The polyp was removed \par      with a hot snare. Resection and retrieval were comple.\par      A 6 mm polyp was found in the descending colon. The polyp was sessile. The polyp was removed \par      with a cold snare. Resection and retrieval were complete. To prevent bleeding after the \par      polypectomy, one hemostatic clip was successfully placed (MR conditional). There was no \par      bleeding at the end of the procedure.\par      An 8 mm polyp was found in the sigmoid colon. The polyp was sessile. The polyp was removed \par      with a cold snare. Resection and retrieval were complete.\par      A 1 cm polyp was found in the recto-sigmoid colon. The polyp was pedunculated. The polyp was \par      removed with a hot snare. Resection and retrieval were complete.\par      A 1 cm polyp was found in the recto-sigmoid colon. The polyp was pedunculated. The polyp was \par      removed with a hot snare. Resection and retrieval were complete.\par      A 7mm polyp was found in the rectum. The polyp was pedunculated. The polyp was removed with a \par      cold snare. Resection and retrieval were complete.\par      A 5 mm polyp was found in the rectum. The polyp was sessile. The polyp was removed with a hot \par      snare. Resection and retrieval were complete.\par      Few small sessile polyps were seen in the rectum. The polyps was sessile. The polyps were \par      removed with a cold biopsy forceps. Resection and retrieval were complete.\par      The visualized portions of terminal ileum appeared normal.\par      Non-bleeding internal hemorrhoids were found during retroflexion. The hemorrhoids were small.\par                                                                                                     \par Impression:          - Non-thrombosed external hemorrhoids found on perianal exam.\par                      - Stool in the entire examined colon. Copious irrigation was used with \par                      adequate visualization.\par                      - Two 3 mm polyps in the cecum. Resected and retrieved.\par                      - One 3 mm polyp in the descending colon, removed with a cold biopsy forceps.\par                      - Two 1 cm polyps in the descending colon, removed with a hot snare.\par                      - One 6 mm polyp in the descending colon, removed with a cold snare. Clip was \par                      placed.\par                      - One 8 mm polyp in the sigmoid colon, removed with a cold snare.\par                      - One 1 cm polyp at the recto-sigmoid colon, removed with a hot snare.\par                      - One 1 cm polyp at the recto-sigmoid colon, removed with a hot snare.\par                      - One 7 mm polyp in the rectum, removed with a cold snare.\par                      - One 5 mm polyp in the rectum, removed with a hot snare.\par                      - Few small polyps in the rectum. Two 3mm polyps were removed with a cold \par                      biopsy forceps.\par Recommendation:      - Discharge patient to home (ambulatory).\par                      - Await pathology results.\par                      - Repeat colonoscopy in 6 months for surveillance given multiple polyps and \par                      fair prep.\par                      - Refer to Dr. Chucho Almonte for genetic testing.\par                      - Return to GI clinic in 1 month.\par                                                                                                     \par Attending Participation:\par      I was present and participated during the entire procedure, including non-key portions.\par                                                                                                      [de-identified] : \par EXAM:  CT ABDOMEN AND PELVIS OC IC  \par \par PROCEDURE DATE:  04/22/2019  \par \par \par INTERPRETATION:  CLINICAL INFORMATION: Generalized abdominal pain.\par \par COMPARISON: None.\par \par PROCEDURE: \par CT of the Abdomen and Pelvis was performed with intravenous contrast. \par Intravenous contrast: 90 ml Omnipaque 350. 10 ml discarded.\par Oral contrast: positive contrast was administered.\par Sagittal and coronal reformats were performed.\par \par FINDINGS:\par \par LOWER CHEST: Bibasilar linear scarring or atelectasis.\par \par LIVER: Within normal limits.\par BILE DUCTS: Normal caliber.\par GALLBLADDER: Within normal limits.\par SPLEEN: Within normal limits.\par PANCREAS: Within normal limits.\par ADRENALS: Within normal limits.\par KIDNEYS/URETERS: Subcentimeter bilateral hypodensities too small to \par characterize. Otherwise within normal limits.\par \par BLADDER: Distended bladder. \par REPRODUCTIVE ORGANS: Within normal limits. \par \par BOWEL: No bowel obstruction. Appendix within normal limits. Mild colonic \par diverticulosis.\par PERITONEUM: No ascites.\par VESSELS:  Within normal limits.\par LYMPH NODES: No lymphadenopathy.  \par ABDOMINAL WALL: Small fat-containing umbilical hernia. Small \par fat-containing right inguinal hernia.\par BONES: No suspicious osseous lesion. Degenerative changes of the spine.\par OTHER:  Subcentimeter fat attenuation foci within the right gluteus \par zully, possibly small lipomas.                                          \par \par IMPRESSION: \par No evidence of an acute infectious or inflammatory process within the \par abdomen and pelvis.  \par \par \par \par \par \par \par \par \par \par \par KOJO QUINTANA \par This document has been electronically signed. Apr 22 2019  8:03PM\par   \par \par   \par

## 2019-05-07 NOTE — END OF VISIT
[] : Fellow [FreeTextEntry3] : As modified and discussed with patient\par MD ALLISON Lynch [facility]\par Associate Professor of Medicine\par [facility] [employer]\par

## 2019-05-07 NOTE — PHYSICAL EXAM
[General Appearance - Alert] : alert [General Appearance - In No Acute Distress] : in no acute distress [Sclera] : the sclera and conjunctiva were normal [Extraocular Movements] : extraocular movements were intact [Oropharynx] : the oropharynx was normal [Neck Appearance] : the appearance of the neck was normal [Exaggerated Use Of Accessory Muscles For Inspiration] : no accessory muscle use [Respiration, Rhythm And Depth] : normal respiratory rhythm and effort [Heart Rate And Rhythm] : heart rate was normal and rhythm regular [Apical Impulse] : the apical impulse was normal [Bowel Sounds] : normal bowel sounds [Abdomen Soft] : soft [] : no hepato-splenomegaly [Abdomen Hernia] : no hernia was discovered [Abdomen Tenderness] : non-tender [Supraclavicular Lymph Nodes Enlarged Bilaterally] : supraclavicular [Abnormal Walk] : normal gait [Musculoskeletal - Swelling] : no joint swelling seen [Cervical Lymph Nodes Enlarged Posterior Bilaterally] : posterior cervical [Oriented To Time, Place, And Person] : oriented to person, place, and time [Affect] : the affect was normal

## 2019-05-07 NOTE — ASSESSMENT
[FreeTextEntry1] : Impression:\par \par 1. Multiple tubular and serrated adenomatous polyps of the colon: Differential diagnosis includes familial adenomatous polyposis, MUTYH-associated polyposis, and Virgen syndrome. Patient denies family history of colorectal cancer or colon polyps, making FAP and MAP less likely, however, only one of his siblings has undergone colonoscopy (which was normal). Patient underwent initial colonoscopy in 8/2018 and was recommended to have repeat colonoscopy in 6 months, however, the patient did not follow-up.\par 2. Report of hematemesis: Differential diagnosis includes esophagitis, erosive gastritis, peptic ulcer disease, gastric angioectasias, esophageal malignancy, gastric malignancy, and duodenal malignancy. \par \par Plan:\par - Plan for EGD/colonoscopy; patient was counseled on the risks of the bleeding, infection, perforation, and anesthesia\par - GoLYTELY and bisacodyl for bowel preparation\par - Check CBC, CMP\par - Check Virgen syndrome panel\par

## 2019-06-03 RX ORDER — POLYETHYLENE GLYCOL 3350 AND ELECTROLYTES WITH LEMON FLAVOR 236; 22.74; 6.74; 5.86; 2.97 G/4L; G/4L; G/4L; G/4L; G/4L
236 POWDER, FOR SOLUTION ORAL
Qty: 1 | Refills: 0 | Status: ACTIVE | COMMUNITY
Start: 2019-05-07 | End: 1900-01-01

## 2019-06-04 ENCOUNTER — RX CHANGE (OUTPATIENT)
Age: 49
End: 2019-06-04

## 2019-06-04 RX ORDER — BISACODYL 5 MG/1
5 TABLET ORAL
Qty: 4 | Refills: 0 | Status: ACTIVE | COMMUNITY
Start: 2019-06-04 | End: 1900-01-01

## 2019-06-06 ENCOUNTER — OUTPATIENT (OUTPATIENT)
Dept: OUTPATIENT SERVICES | Facility: HOSPITAL | Age: 49
LOS: 1 days | End: 2019-06-06
Payer: MEDICARE

## 2019-06-06 ENCOUNTER — RESULT REVIEW (OUTPATIENT)
Age: 49
End: 2019-06-06

## 2019-06-06 ENCOUNTER — RX RENEWAL (OUTPATIENT)
Age: 49
End: 2019-06-06

## 2019-06-06 DIAGNOSIS — K20.8 OTHER ESOPHAGITIS: ICD-10-CM

## 2019-06-06 DIAGNOSIS — K92.0 HEMATEMESIS: ICD-10-CM

## 2019-06-06 PROCEDURE — 43239 EGD BIOPSY SINGLE/MULTIPLE: CPT | Mod: GC

## 2019-06-06 PROCEDURE — 43239 EGD BIOPSY SINGLE/MULTIPLE: CPT

## 2019-06-06 PROCEDURE — 45380 COLONOSCOPY AND BIOPSY: CPT | Mod: GC

## 2019-06-06 PROCEDURE — 45380 COLONOSCOPY AND BIOPSY: CPT

## 2019-06-06 RX ORDER — PANTOPRAZOLE 40 MG/1
40 TABLET, DELAYED RELEASE ORAL TWICE DAILY
Qty: 120 | Refills: 3 | Status: ACTIVE | COMMUNITY
Start: 2019-06-06 | End: 1900-01-01

## 2019-06-26 ENCOUNTER — RESULT REVIEW (OUTPATIENT)
Age: 49
End: 2019-06-26

## 2019-07-02 NOTE — ED ADULT TRIAGE NOTE - DIRECT TO ROOM CARE INITIATED:
Attempted to call report to Arnold Walls RN at [employer] who stated that they are not to take the patient back  Arnold Walls requesting that I speak with HAO and I was transferred to DON, HAO did not  phone  Notified [facility] nurse, [facility] Coordinator, and [facility] unit manager     Vickie Mccracken, RODOLFO  07/02/19 [id number] 18-Jul-2017 12:55

## 2019-07-18 ENCOUNTER — OUTPATIENT (OUTPATIENT)
Dept: OUTPATIENT SERVICES | Facility: HOSPITAL | Age: 49
LOS: 1 days | End: 2019-07-18
Payer: MEDICARE

## 2019-07-18 PROCEDURE — D0220: CPT

## 2019-07-18 PROCEDURE — D9110: CPT

## 2019-10-17 DIAGNOSIS — Z01.20 ENCOUNTER FOR DENTAL EXAMINATION AND CLEANING WITHOUT ABNORMAL FINDINGS: ICD-10-CM

## 2019-10-17 DIAGNOSIS — K08.9 DISORDER OF TEETH AND SUPPORTING STRUCTURES, UNSPECIFIED: ICD-10-CM

## 2020-01-15 ENCOUNTER — APPOINTMENT (OUTPATIENT)
Dept: INTERNAL MEDICINE | Facility: CLINIC | Age: 50
End: 2020-01-15
Payer: MEDICARE

## 2020-01-15 ENCOUNTER — NON-APPOINTMENT (OUTPATIENT)
Age: 50
End: 2020-01-15

## 2020-01-15 VITALS
DIASTOLIC BLOOD PRESSURE: 72 MMHG | OXYGEN SATURATION: 95 % | TEMPERATURE: 98.2 F | BODY MASS INDEX: 22.75 KG/M2 | WEIGHT: 168 LBS | HEIGHT: 72 IN | HEART RATE: 78 BPM | RESPIRATION RATE: 17 BRPM | SYSTOLIC BLOOD PRESSURE: 109 MMHG

## 2020-01-15 DIAGNOSIS — F31.9 BIPOLAR DISORDER, UNSPECIFIED: ICD-10-CM

## 2020-01-15 DIAGNOSIS — D12.6 BENIGN NEOPLASM OF COLON, UNSPECIFIED: ICD-10-CM

## 2020-01-15 PROCEDURE — 93000 ELECTROCARDIOGRAM COMPLETE: CPT | Mod: 59

## 2020-01-15 PROCEDURE — 99406 BEHAV CHNG SMOKING 3-10 MIN: CPT

## 2020-01-15 PROCEDURE — G0439: CPT

## 2020-01-19 DIAGNOSIS — Z00.00 ENCOUNTER FOR GENERAL ADULT MEDICAL EXAMINATION W/OUT ABNORMAL FINDINGS: ICD-10-CM

## 2020-01-19 LAB
ALBUMIN SERPL ELPH-MCNC: 4.3 G/DL
ALP BLD-CCNC: 75 U/L
ALT SERPL-CCNC: 8 U/L
ANION GAP SERPL CALC-SCNC: 12 MMOL/L
APPEARANCE: CLEAR
AST SERPL-CCNC: 14 U/L
BASOPHILS # BLD AUTO: 0.07 K/UL
BASOPHILS NFR BLD AUTO: 0.7 %
BILIRUB SERPL-MCNC: 0.5 MG/DL
BILIRUBIN URINE: NEGATIVE
BLOOD URINE: NEGATIVE
BUN SERPL-MCNC: 22 MG/DL
CALCIUM SERPL-MCNC: 8.9 MG/DL
CHLORIDE SERPL-SCNC: 105 MMOL/L
CHOLEST SERPL-MCNC: 160 MG/DL
CHOLEST/HDLC SERPL: 5 RATIO
CO2 SERPL-SCNC: 24 MMOL/L
COLOR: YELLOW
CREAT SERPL-MCNC: 0.83 MG/DL
EOSINOPHIL # BLD AUTO: 0.22 K/UL
EOSINOPHIL NFR BLD AUTO: 2.3 %
ESTIMATED AVERAGE GLUCOSE: 111 MG/DL
GLUCOSE QUALITATIVE U: NEGATIVE
GLUCOSE SERPL-MCNC: 94 MG/DL
HBA1C MFR BLD HPLC: 5.5 %
HCT VFR BLD CALC: 48.9 %
HDLC SERPL-MCNC: 32 MG/DL
HGB BLD-MCNC: 15.8 G/DL
IMM GRANULOCYTES NFR BLD AUTO: 0.3 %
KETONES URINE: NEGATIVE
LDLC SERPL CALC-MCNC: 113 MG/DL
LEUKOCYTE ESTERASE URINE: NEGATIVE
LYMPHOCYTES # BLD AUTO: 0.94 K/UL
LYMPHOCYTES NFR BLD AUTO: 9.6 %
MAN DIFF?: NORMAL
MCHC RBC-ENTMCNC: 31.7 PG
MCHC RBC-ENTMCNC: 32.3 GM/DL
MCV RBC AUTO: 98.2 FL
MONOCYTES # BLD AUTO: 0.48 K/UL
MONOCYTES NFR BLD AUTO: 4.9 %
NEUTROPHILS # BLD AUTO: 8.01 K/UL
NEUTROPHILS NFR BLD AUTO: 82.2 %
NITRITE URINE: NEGATIVE
PH URINE: 7
PLATELET # BLD AUTO: 171 K/UL
POTASSIUM SERPL-SCNC: 5.1 MMOL/L
PROT SERPL-MCNC: 6.7 G/DL
PROTEIN URINE: NORMAL
PSA FREE FLD-MCNC: 38 %
PSA FREE SERPL-MCNC: 0.42 NG/ML
PSA SERPL-MCNC: 1.09 NG/ML
RBC # BLD: 4.98 M/UL
RBC # FLD: 13.2 %
SODIUM SERPL-SCNC: 141 MMOL/L
SPECIFIC GRAVITY URINE: 1.02
TRIGL SERPL-MCNC: 76 MG/DL
TSH SERPL-ACNC: 0.68 UIU/ML
UROBILINOGEN URINE: NORMAL
WBC # FLD AUTO: 9.75 K/UL

## 2020-03-24 ENCOUNTER — APPOINTMENT (OUTPATIENT)
Dept: GASTROENTEROLOGY | Facility: HOSPITAL | Age: 50
End: 2020-03-24

## 2020-04-21 NOTE — ED PROVIDER NOTE - NS HIV RISK FACTOR YES
"  Physical Therapy Treatment Note     Name: Yisel Salinas  Clinic Number: [id number]    Therapy Diagnosis:   No diagnosis found.  Physician: Josh Lee II, *    Visit Date: 4/21/2020    Physician Orders: PT Eval and Treat   Medical Diagnosis from Referral:   S/P ACL reconstruction  - Primary    Z98.890V45.89   Evaluation Date: 4/9/2020  Authorization Period Expiration: 4/17/2020 ( from verbal authorization, more to come)  Plan of Care Certification Period: 5/22/2020  Visit #/Visits authorized: 2/ 3  (from verbal Auth)   Surgery 3/18    Time In: 1015  Time Out: 1120  Total Billable Time: 60 minutes    Precautions: Standard and Post ACL precautions   She can advance to full weight-bearing over the next 3 weeks with crutches. Explained that she can unlock the read tabs walking as well as her exercises  4/30/2020     4-6 weeks:   Estabilish quad control, Restore full knee extension while gradually increasing knee flexion, protect meniscus repair  - Progress PROM 0-120 degrees (passive only)  - Multi-angle quad isometrics, SLR all directions  - Initiate Closed kinetic chain exercises (mini squats 0-45 degrees), weight shifts (diagonal)  - Stationary bike when ROM is appropriate       Subjective     Pt reports: "I've been doing my exercises at home. It seems to go flatter then what it used too. I am getting around a little easier."  She was compliant with home exercise program.  Response to previous treatment: The patient is improving with tolerance to standing activities along exhibiting decreased flexion tone at rest.  Functional change: Improved tolerance to standing and ambulation with crutches.    Pain: 0/10  Location: left knee      Objective       Left knee general edema observed, no objective measures taken this date. Surgical incision is healing well. No drainage noted. Patient is able to don and doff her brace correctly and independently. Decreased left quad active contraction noted. Slight patella " movement is seen with contraction.    Yisel received therapeutic exercises to develop strength, endurance, ROM, flexibility and core stabilization for 50 minutes including:      NMES with quad sets to 24 mA to retrain and increase contraction of the quad musculature x 10 minutes    Seated Hamstring Stretches 3 x 60 seconds with rope pulling into Dorsiflexion    SLR RLE (3 # weight 2 x 10, no weight LLE at 2 x 10 with CGA to SBA with verbal cues     - Able to progress significantly with this throughout the treatment session    Side-lying hip abduction 3# ankle weight 2 x15    Ankle pumps with GTB x 20 and kept pain free at the knee    PROM performed into flexion by PT x 15 reps. Patient comfortably tolerated 108 degrees of passive flexion.     - Firm End feel with increased tenderness      Yisel received the following supervised modalities after being cleared for contradictions: IFC Electrical Stimulation:  Yisel received IFC Electrical Stimulation for pain control applied to the left knee. Pt received stimulation at 28 % scan at a frequency of  Hz for 10 minutes. Yisel tolderated treatment well without any adverse effects.      Home Exercises Provided and Patient Education Provided     Education provided:   Educated on her ability to place 50% of total weight bearing through the LLE. Provided biofeedback with a scale underneath her leg with several trials of placing up to 80 pounds of weight through and able to demonstrate teach back education with no exacerbation of pain.   Reminded to keep her     Written Home Exercises Provided: Patient instructed to cont prior HEP.  Exercises were reviewed and Yisel was able to demonstrate them prior to the end of the session.  Yisel demonstrated good  understanding of the education provided.     See EMR under Media for exercises provided prior visit.    Assessment     The patient continues to exhibit typical post surgery edema with decreased Left knee strength  and AROM. Still demonstrating limited active Right quad activation and is still needing NMES to address this. Demonstrating slightly increased quadriceps activation with slight patellar movement.    Patient is ambulating correctly with her crutches under the current protocol and is utilizing the brace safely demonstrating PWBing at this time with both crutches and her brace unlocked. The patient is compliant with her instructions and has good potential to reach her LTGs.  Yisel is progressing well towards her goals.   Pt prognosis is Good.     Pt will continue to benefit from skilled outpatient physical therapy to address the deficits listed in the problem list box on initial evaluation, provide pt/family education and to maximize pt's level of independence in the home and community environment.     Pt's spiritual, cultural and educational needs considered and pt agreeable to plan of care and goals.     Anticipated barriers to physical therapy include surgical protocols    Goals:  Short Term Goals: 3 weeks   1. Patient will verbalize a maximum level of pain at 2/10 in order to improve her stability with daily activities  2. Patient will tolerate ambulating with no ' to improve her ability to walk community distances.  3. Patient will demonstrate 120 degrees of passive L knee flexion to improve her ability to climb stairs in her community     Long Term Goals: 6 weeks   1. Patient will demonstrate full left knee ROM in both flexion and extension.  2. Patient will be able to perform squats to 70 degrees without an increase her ability to pick objects up off the floor.  3. Patient will demonstrate 4+/5 left lower extremity strength to her ability to tolerate walking 2 blocks  4. Patient will ambulate 1,000' without an AD and no increase in pain.       Plan     PT will continue to progress surgical protocol as the patient tolerates in order to address all deficits and have her return to her desired  PLOF.    Theresa Escobar, PT    Declined

## 2020-06-02 ENCOUNTER — APPOINTMENT (OUTPATIENT)
Dept: GASTROENTEROLOGY | Facility: HOSPITAL | Age: 50
End: 2020-06-02

## 2020-12-24 ENCOUNTER — NON-APPOINTMENT (OUTPATIENT)
Age: 50
End: 2020-12-24

## 2022-01-07 RX ORDER — POLYETHYLENE GLYCOL 3350 AND ELECTROLYTES WITH LEMON FLAVOR 236; 22.74; 6.74; 5.86; 2.97 G/4L; G/4L; G/4L; G/4L; G/4L
236 POWDER, FOR SOLUTION ORAL
Qty: 1 | Refills: 0 | Status: ACTIVE | COMMUNITY
Start: 2022-01-07 | End: 1900-01-01

## 2022-01-18 ENCOUNTER — OUTPATIENT (OUTPATIENT)
Dept: OUTPATIENT SERVICES | Facility: HOSPITAL | Age: 52
LOS: 1 days | End: 2022-01-18
Payer: MEDICARE

## 2022-01-18 VITALS
SYSTOLIC BLOOD PRESSURE: 124 MMHG | HEIGHT: 72 IN | OXYGEN SATURATION: 97 % | DIASTOLIC BLOOD PRESSURE: 86 MMHG | TEMPERATURE: 99 F | HEART RATE: 92 BPM | RESPIRATION RATE: 16 BRPM | WEIGHT: 172.62 LBS

## 2022-01-18 DIAGNOSIS — F20.9 SCHIZOPHRENIA, UNSPECIFIED: ICD-10-CM

## 2022-01-18 DIAGNOSIS — D12.6 BENIGN NEOPLASM OF COLON, UNSPECIFIED: ICD-10-CM

## 2022-01-18 DIAGNOSIS — Z01.818 ENCOUNTER FOR OTHER PREPROCEDURAL EXAMINATION: ICD-10-CM

## 2022-01-18 DIAGNOSIS — Z98.890 OTHER SPECIFIED POSTPROCEDURAL STATES: Chronic | ICD-10-CM

## 2022-01-18 LAB
ALBUMIN SERPL ELPH-MCNC: 4.1 G/DL — SIGNIFICANT CHANGE UP (ref 3.3–5)
ALP SERPL-CCNC: 123 U/L — HIGH (ref 40–120)
ALT FLD-CCNC: 10 U/L — SIGNIFICANT CHANGE UP (ref 10–45)
ANION GAP SERPL CALC-SCNC: 13 MMOL/L — SIGNIFICANT CHANGE UP (ref 5–17)
AST SERPL-CCNC: 11 U/L — SIGNIFICANT CHANGE UP (ref 10–40)
BILIRUB SERPL-MCNC: 0.3 MG/DL — SIGNIFICANT CHANGE UP (ref 0.2–1.2)
BUN SERPL-MCNC: 12 MG/DL — SIGNIFICANT CHANGE UP (ref 7–23)
CALCIUM SERPL-MCNC: 8.9 MG/DL — SIGNIFICANT CHANGE UP (ref 8.4–10.5)
CHLORIDE SERPL-SCNC: 101 MMOL/L — SIGNIFICANT CHANGE UP (ref 96–108)
CO2 SERPL-SCNC: 23 MMOL/L — SIGNIFICANT CHANGE UP (ref 22–31)
CREAT SERPL-MCNC: 0.81 MG/DL — SIGNIFICANT CHANGE UP (ref 0.5–1.3)
GLUCOSE SERPL-MCNC: 97 MG/DL — SIGNIFICANT CHANGE UP (ref 70–99)
HCT VFR BLD CALC: 45 % — SIGNIFICANT CHANGE UP (ref 39–50)
HGB BLD-MCNC: 15 G/DL — SIGNIFICANT CHANGE UP (ref 13–17)
MCHC RBC-ENTMCNC: 31 PG — SIGNIFICANT CHANGE UP (ref 27–34)
MCHC RBC-ENTMCNC: 33.3 GM/DL — SIGNIFICANT CHANGE UP (ref 32–36)
MCV RBC AUTO: 93 FL — SIGNIFICANT CHANGE UP (ref 80–100)
NRBC # BLD: 0 /100 WBCS — SIGNIFICANT CHANGE UP (ref 0–0)
PLATELET # BLD AUTO: 210 K/UL — SIGNIFICANT CHANGE UP (ref 150–400)
POTASSIUM SERPL-MCNC: 4 MMOL/L — SIGNIFICANT CHANGE UP (ref 3.5–5.3)
POTASSIUM SERPL-SCNC: 4 MMOL/L — SIGNIFICANT CHANGE UP (ref 3.5–5.3)
PROT SERPL-MCNC: 7.3 G/DL — SIGNIFICANT CHANGE UP (ref 6–8.3)
RBC # BLD: 4.84 M/UL — SIGNIFICANT CHANGE UP (ref 4.2–5.8)
RBC # FLD: 13.4 % — SIGNIFICANT CHANGE UP (ref 10.3–14.5)
SODIUM SERPL-SCNC: 137 MMOL/L — SIGNIFICANT CHANGE UP (ref 135–145)
WBC # BLD: 9.5 K/UL — SIGNIFICANT CHANGE UP (ref 3.8–10.5)
WBC # FLD AUTO: 9.5 K/UL — SIGNIFICANT CHANGE UP (ref 3.8–10.5)

## 2022-01-18 PROCEDURE — 85027 COMPLETE CBC AUTOMATED: CPT

## 2022-01-18 PROCEDURE — G0463: CPT

## 2022-01-18 PROCEDURE — 80053 COMPREHEN METABOLIC PANEL: CPT

## 2022-01-18 PROCEDURE — 36415 COLL VENOUS BLD VENIPUNCTURE: CPT

## 2022-01-18 RX ORDER — BENZTROPINE MESYLATE 1 MG
1 TABLET ORAL
Qty: 0 | Refills: 0 | DISCHARGE

## 2022-01-18 RX ORDER — PREGABALIN 225 MG/1
0 CAPSULE ORAL
Qty: 0 | Refills: 0 | DISCHARGE

## 2022-01-18 RX ORDER — NALTREXONE HYDROCHLORIDE 50 MG/1
1 TABLET, FILM COATED ORAL
Qty: 0 | Refills: 0 | DISCHARGE

## 2022-01-18 RX ORDER — HALOPERIDOL DECANOATE 100 MG/ML
1 INJECTION INTRAMUSCULAR
Qty: 0 | Refills: 0 | DISCHARGE

## 2022-01-18 RX ORDER — SERTRALINE 25 MG/1
1 TABLET, FILM COATED ORAL
Qty: 0 | Refills: 0 | DISCHARGE

## 2022-01-18 NOTE — H&P PST ADULT - PROBLEM SELECTOR PLAN 1
Colonoscopy/Polypectomy under Anesthesia on 1/27/22  Pre-op instructions provided - all questions answered  Labs done at [place] [id number] to be arranged by Group Home Staff  Transportation to be arranged by [facility] Staff for DOS

## 2022-01-18 NOTE — H&P PST ADULT - NSICDXPASTSURGICALHX_GEN_ALL_CORE_FT
PAST SURGICAL HISTORY:  S/P colonoscopy 2019     PAST SURGICAL HISTORY:  S/P colonoscopy 2018 12/2021

## 2022-01-18 NOTE — H&P PST ADULT - HISTORY OF PRESENT ILLNESS
a year ago, polyps could not be emoved Pt. lives in group home, pt. able to participate in care, answering questions appropriately, spoke with Ame Raza from [facility], [facility]. ([facility]) [phone] to verify some information.  52 yo male, PMH bipolar vs schizoaffective disorder, depressive disorder with suicidal thoughts and hospitalized 2017, s/p colonoscopy 2018 with multiple polyps, repeat colonoscopy 12/23/21 and some polyps were unable to be removed. Pt. presents to PST for scheduled Colonoscopy/Polypectomy on 1/27/22. Pt. denies COVID-19 infection in 2020/2022, denies close contact with anyone that has been ill, no fever, cough, dyspnea in past two weeks.    Staff member at group [facility] made aware pt. needs to be scheduled for COVID-19 testing 72 hours before procedure, phone # of [facility] facilities provided.    Requested last medical note on file from [facility], spoke with Katherine and will fax to [facility]

## 2022-01-18 NOTE — H&P PST ADULT - NEGATIVE GASTROINTESTINAL SYMPTOMS
no nausea/no vomiting/no change in bowel habits/no abdominal pain no nausea/no vomiting/no change in bowel habits/no flatulence/no abdominal pain/no melena/no jaundice

## 2022-01-18 NOTE — H&P PST ADULT - NSICDXPASTMEDICALHX_GEN_ALL_CORE_FT
PAST MEDICAL HISTORY:  Alcohol abuse     Schizophrenia      PAST MEDICAL HISTORY:  Alcohol abuse last drink 2017    Bipolar illness hospitalized for depression and suicidal thoughs 2017 ([facility])    Schizophrenia

## 2022-01-26 RX ORDER — SODIUM CHLORIDE 9 MG/ML
500 INJECTION INTRAMUSCULAR; INTRAVENOUS; SUBCUTANEOUS
Refills: 0 | Status: DISCONTINUED | OUTPATIENT
Start: 2022-01-27 | End: 2022-02-10

## 2022-01-26 NOTE — PRE PROCEDURE NOTE - PRE PROCEDURE EVALUATION
Pre-Endoscopy Evaluation    Attending Physician: Julio    Procedure: Colonoscopy     Indication for Procedure:     Pertinent History: See Allscripts chart    PAST MEDICAL & SURGICAL HISTORY:  Schizophrenia    Alcohol abuse  last drink 2017    Bipolar illness  hospitalized for depression and suicidal thoughs 2017 ([facility])    S/P colonoscopy  2018  12/2021        Allergies    No Known Allergies    Intolerances        Medications: MEDICATIONS  (STANDING):    MEDICATIONS  (PRN):      Pertinent lab data:                      Physical Examination:  Daily     Daily   Vital Signs Last 24 Hrs  T(C): --  T(F): --  HR: --  BP: --  BP(mean): --  RR: --  SpO2: --  Constitutional: NAD  HEENT: PERRLA, EOMI,    Neck:  No JVD  Respiratory: CTAB/L  Cardiovascular: S1 and S2  Gastrointestinal: BS+, soft, NT/ND  Extremities: No peripheral edema  Neurological: A/O x 3, no focal deficits  Psychiatric: Normal mood, normal affect  : No Mandel  Skin: No rashes    Comments:    ASA Class: I []  II []  III []  IV []    The patient is a suitable candidate for the planned procedure unless box checked [ ]  No, explain:

## 2022-01-27 ENCOUNTER — OUTPATIENT (OUTPATIENT)
Dept: OUTPATIENT SERVICES | Facility: HOSPITAL | Age: 52
LOS: 1 days | Discharge: HOME | End: 2022-01-27
Payer: MEDICARE

## 2022-01-27 ENCOUNTER — RESULT REVIEW (OUTPATIENT)
Age: 52
End: 2022-01-27

## 2022-01-27 ENCOUNTER — APPOINTMENT (OUTPATIENT)
Dept: GASTROENTEROLOGY | Facility: HOSPITAL | Age: 52
End: 2022-01-27

## 2022-01-27 VITALS
OXYGEN SATURATION: 95 % | DIASTOLIC BLOOD PRESSURE: 68 MMHG | HEART RATE: 72 BPM | SYSTOLIC BLOOD PRESSURE: 112 MMHG | RESPIRATION RATE: 17 BRPM

## 2022-01-27 VITALS
DIASTOLIC BLOOD PRESSURE: 80 MMHG | RESPIRATION RATE: 22 BRPM | HEART RATE: 89 BPM | WEIGHT: 169.98 LBS | HEIGHT: 72 IN | SYSTOLIC BLOOD PRESSURE: 127 MMHG | TEMPERATURE: 98 F | OXYGEN SATURATION: 96 %

## 2022-01-27 DIAGNOSIS — Z98.890 OTHER SPECIFIED POSTPROCEDURAL STATES: Chronic | ICD-10-CM

## 2022-01-27 DIAGNOSIS — D12.6 BENIGN NEOPLASM OF COLON, UNSPECIFIED: ICD-10-CM

## 2022-01-27 PROCEDURE — 45385 COLONOSCOPY W/LESION REMOVAL: CPT | Mod: 59

## 2022-01-27 PROCEDURE — 45388 COLONOSCOPY W/ABLATION: CPT | Mod: 59

## 2022-01-27 PROCEDURE — 88305 TISSUE EXAM BY PATHOLOGIST: CPT

## 2022-01-27 PROCEDURE — 45385 COLONOSCOPY W/LESION REMOVAL: CPT

## 2022-01-27 PROCEDURE — 88305 TISSUE EXAM BY PATHOLOGIST: CPT | Mod: 26

## 2022-01-27 PROCEDURE — 45382 COLONOSCOPY W/CONTROL BLEED: CPT | Mod: XU

## 2022-01-27 PROCEDURE — C1889: CPT

## 2022-01-27 PROCEDURE — 45381 COLONOSCOPY SUBMUCOUS NJX: CPT

## 2022-01-27 DEVICE — IMPLANTABLE DEVICE: Type: IMPLANTABLE DEVICE | Status: FUNCTIONAL

## 2022-01-27 DEVICE — NET RETRV ROT ROTH 2.5MMX230CM: Type: IMPLANTABLE DEVICE | Status: FUNCTIONAL

## 2022-01-27 DEVICE — CLIP RESOLUTION 360 235CM: Type: IMPLANTABLE DEVICE | Status: FUNCTIONAL

## 2022-01-27 RX ORDER — TRAZODONE HCL 50 MG
1 TABLET ORAL
Qty: 0 | Refills: 0 | DISCHARGE

## 2022-01-27 NOTE — ASU PATIENT PROFILE, ADULT - FALL HARM RISK - RISK INTERVENTIONS

## 2022-01-27 NOTE — ASU PATIENT PROFILE, ADULT - NSICDXPASTMEDICALHX_GEN_ALL_CORE_FT
PAST MEDICAL HISTORY:  Alcohol abuse last drink 2017    Bipolar illness hospitalized for depression and suicidal thoughs 2017 ([facility])    Schizophrenia

## 2022-02-03 LAB — SURGICAL PATHOLOGY STUDY: SIGNIFICANT CHANGE UP

## 2022-02-15 PROBLEM — F31.9 BIPOLAR DISORDER, UNSPECIFIED: Chronic | Status: ACTIVE | Noted: 2022-01-18

## 2022-02-15 PROBLEM — F10.10 ALCOHOL ABUSE, UNCOMPLICATED: Chronic | Status: ACTIVE | Noted: 2019-04-22

## 2022-03-11 ENCOUNTER — OUTPATIENT (OUTPATIENT)
Dept: OUTPATIENT SERVICES | Facility: HOSPITAL | Age: 52
LOS: 1 days | End: 2022-03-11
Payer: MEDICARE

## 2022-03-11 VITALS
RESPIRATION RATE: 18 BRPM | DIASTOLIC BLOOD PRESSURE: 88 MMHG | TEMPERATURE: 99 F | HEART RATE: 92 BPM | OXYGEN SATURATION: 97 % | HEIGHT: 72 IN | WEIGHT: 188.94 LBS | SYSTOLIC BLOOD PRESSURE: 128 MMHG

## 2022-03-11 DIAGNOSIS — Z98.890 OTHER SPECIFIED POSTPROCEDURAL STATES: Chronic | ICD-10-CM

## 2022-03-11 DIAGNOSIS — K05.6 PERIODONTAL DISEASE, UNSPECIFIED: ICD-10-CM

## 2022-03-11 DIAGNOSIS — Z01.818 ENCOUNTER FOR OTHER PREPROCEDURAL EXAMINATION: ICD-10-CM

## 2022-03-11 DIAGNOSIS — R56.9 UNSPECIFIED CONVULSIONS: ICD-10-CM

## 2022-03-11 DIAGNOSIS — K02.62 DENTAL CARIES ON SMOOTH SURFACE PENETRATING INTO DENTIN: ICD-10-CM

## 2022-03-11 LAB
ANION GAP SERPL CALC-SCNC: 10 MMOL/L — SIGNIFICANT CHANGE UP (ref 5–17)
BUN SERPL-MCNC: 13 MG/DL — SIGNIFICANT CHANGE UP (ref 7–23)
CALCIUM SERPL-MCNC: 9 MG/DL — SIGNIFICANT CHANGE UP (ref 8.4–10.5)
CHLORIDE SERPL-SCNC: 103 MMOL/L — SIGNIFICANT CHANGE UP (ref 96–108)
CO2 SERPL-SCNC: 26 MMOL/L — SIGNIFICANT CHANGE UP (ref 22–31)
CREAT SERPL-MCNC: 0.93 MG/DL — SIGNIFICANT CHANGE UP (ref 0.5–1.3)
EGFR: 98 ML/MIN/1.73M2 — SIGNIFICANT CHANGE UP
GLUCOSE SERPL-MCNC: 104 MG/DL — HIGH (ref 70–99)
HCT VFR BLD CALC: 45.6 % — SIGNIFICANT CHANGE UP (ref 39–50)
HGB BLD-MCNC: 15.4 G/DL — SIGNIFICANT CHANGE UP (ref 13–17)
MCHC RBC-ENTMCNC: 31.8 PG — SIGNIFICANT CHANGE UP (ref 27–34)
MCHC RBC-ENTMCNC: 33.8 GM/DL — SIGNIFICANT CHANGE UP (ref 32–36)
MCV RBC AUTO: 94.2 FL — SIGNIFICANT CHANGE UP (ref 80–100)
NRBC # BLD: 0 /100 WBCS — SIGNIFICANT CHANGE UP (ref 0–0)
PLATELET # BLD AUTO: 208 K/UL — SIGNIFICANT CHANGE UP (ref 150–400)
POTASSIUM SERPL-MCNC: 4.3 MMOL/L — SIGNIFICANT CHANGE UP (ref 3.5–5.3)
POTASSIUM SERPL-SCNC: 4.3 MMOL/L — SIGNIFICANT CHANGE UP (ref 3.5–5.3)
RBC # BLD: 4.84 M/UL — SIGNIFICANT CHANGE UP (ref 4.2–5.8)
RBC # FLD: 13.8 % — SIGNIFICANT CHANGE UP (ref 10.3–14.5)
SODIUM SERPL-SCNC: 139 MMOL/L — SIGNIFICANT CHANGE UP (ref 135–145)
WBC # BLD: 7.85 K/UL — SIGNIFICANT CHANGE UP (ref 3.8–10.5)
WBC # FLD AUTO: 7.85 K/UL — SIGNIFICANT CHANGE UP (ref 3.8–10.5)

## 2022-03-11 PROCEDURE — G0463: CPT

## 2022-03-11 PROCEDURE — 80048 BASIC METABOLIC PNL TOTAL CA: CPT

## 2022-03-11 PROCEDURE — 85027 COMPLETE CBC AUTOMATED: CPT

## 2022-03-11 PROCEDURE — 36415 COLL VENOUS BLD VENIPUNCTURE: CPT

## 2022-03-11 RX ORDER — SODIUM CHLORIDE 9 MG/ML
3 INJECTION INTRAMUSCULAR; INTRAVENOUS; SUBCUTANEOUS EVERY 8 HOURS
Refills: 0 | Status: DISCONTINUED | OUTPATIENT
Start: 2022-03-23 | End: 2022-04-06

## 2022-03-11 RX ORDER — LIDOCAINE HCL 20 MG/ML
0.2 VIAL (ML) INJECTION ONCE
Refills: 0 | Status: DISCONTINUED | OUTPATIENT
Start: 2022-03-23 | End: 2022-04-06

## 2022-03-11 NOTE — H&P PST ADULT - NSICDXPASTMEDICALHX_GEN_ALL_CORE_FT
PAST MEDICAL HISTORY:  Alcohol abuse last drink 2017    Bipolar illness hospitalized for depression and suicidal thoughs 2017 ([facility])    Schizophrenia      PAST MEDICAL HISTORY:  Alcohol abuse last drink 2017    Bipolar illness hospitalized for depression and suicidal thoughs 2017 ([facility])    Schizophrenia     Seizures stated had seizures once a month, and not on any seizure med, have no neurologist , last seizures 2 weeks ago 2/2022, grand mal     PAST MEDICAL HISTORY:  Alcohol abuse last drink 2017    Bipolar illness hospitalized for depression and suicidal thoughs 2017 ([facility])    Current smoker     Schizophrenia     Seizures stated had seizures once a month, and not on any seizure med, have no neurologist , last seizures 2 weeks ago 2/2022, grand mal

## 2022-03-11 NOTE — H&P PST ADULT - HISTORY OF PRESENT ILLNESS
This is a 50 y/o male c/o dental caries , he presents today for  This is a 52 y/o male c/o dental caries , he presents today for comprehensive dental treatment under anesthesia 3/25/22  covid swab to be done  3/21 at [facility]  denies recent travel. sick contact or covid infection    pt came to [facility] alone, alert and oriented x 3, pt stated had seizures every month , last seizure grand mal 2 weeks ago 2/2022, pt not on any anti seizure meds, pt has no neurologist , all instruction given via phone to counselor at group home  Ame Orlando  [phone], neurology evaluation required prior to surgery, informed Dr Sheffield of the same  This is a 50 y/o male c/o dental caries , he presents today for comprehensive dental treatment under anesthesia 3/25/22  covid swab to be done  3/22 at [facility]  denies recent travel. sick contact or covid infection    pt came to [facility] alone, alert and oriented x 3, pt stated had seizures every month , last seizure grand mal 2 weeks ago 2/2022, pt not on any anti seizure meds, pt has no neurologist , all instruction given via phone to counselor at group home  Ame Orlando  [phone], neurology evaluation required prior to surgery, informed Dr Sheffield of the same

## 2022-03-11 NOTE — H&P PST ADULT - OTHER CARE PROVIDERS
Ame Raza   [phone]    [facility] Guidance and Counseling  Services [facility] Ame Raza   [phone]    [facility] Guidance and Counseling  Services ---group home ---

## 2022-03-15 PROBLEM — R56.9 UNSPECIFIED CONVULSIONS: Chronic | Status: ACTIVE | Noted: 2022-03-11

## 2022-03-15 PROBLEM — F17.200 NICOTINE DEPENDENCE, UNSPECIFIED, UNCOMPLICATED: Chronic | Status: ACTIVE | Noted: 2022-03-11

## 2022-03-21 ENCOUNTER — OUTPATIENT (OUTPATIENT)
Dept: OUTPATIENT SERVICES | Facility: HOSPITAL | Age: 52
LOS: 1 days | End: 2022-03-21
Payer: MEDICARE

## 2022-03-21 DIAGNOSIS — Z98.890 OTHER SPECIFIED POSTPROCEDURAL STATES: Chronic | ICD-10-CM

## 2022-03-21 DIAGNOSIS — Z11.52 ENCOUNTER FOR SCREENING FOR COVID-19: ICD-10-CM

## 2022-03-21 LAB — SARS-COV-2 RNA SPEC QL NAA+PROBE: SIGNIFICANT CHANGE UP

## 2022-03-21 PROCEDURE — U0005: CPT

## 2022-03-21 PROCEDURE — U0003: CPT

## 2022-03-21 PROCEDURE — C9803: CPT

## 2022-03-22 ENCOUNTER — APPOINTMENT (OUTPATIENT)
Dept: NEUROLOGY | Facility: CLINIC | Age: 52
End: 2022-03-22
Payer: MEDICARE

## 2022-03-22 ENCOUNTER — TRANSCRIPTION ENCOUNTER (OUTPATIENT)
Age: 52
End: 2022-03-22

## 2022-03-22 VITALS
SYSTOLIC BLOOD PRESSURE: 130 MMHG | DIASTOLIC BLOOD PRESSURE: 90 MMHG | BODY MASS INDEX: 25.73 KG/M2 | WEIGHT: 190 LBS | HEIGHT: 72 IN | RESPIRATION RATE: 17 BRPM | HEART RATE: 92 BPM

## 2022-03-22 PROCEDURE — 95816 EEG AWAKE AND DROWSY: CPT

## 2022-03-22 PROCEDURE — 99204 OFFICE O/P NEW MOD 45 MIN: CPT

## 2022-03-22 NOTE — HISTORY OF PRESENT ILLNESS
[FreeTextEntry1] : cc- R/o seizures\par \par HPI- 50 y/o LH man with history of schizo-affective and Bipolar disorder, poor historian, resides at a residential facility,  states he has had about 6 nocturnal events of ?GTC over three months at the end of 2021.  He states he was not placed on seizure meds and has not had a recurrence in three months.\par \par Patient states that the seizures happen late at night , he believes in his sleep and none have been witnessed. Denies tongue biting or incontinence. States he becomes aware after he falls out of bed and then sees his limbs moving.\par \par Here now for EEG and clearance prior to dental procedure.\par \par \par meds - Haldol decanoate 100mg/ml \par cogentin \par sertraline 100 mg\par omeprazole\par wellbutrin xl 150mg\par trazadone 50 mg hs

## 2022-03-22 NOTE — ASSESSMENT
[FreeTextEntry1] : A/p- 52 y/o man with schizo-affective /Bipolar d/o with 6 self- reported nocturnal events that he believes are seizures. None witnessed and roommates did not hear anything. None in past three months. EEG without epileptiform today but needs MRI and 48 hr amb EEG.\par If any epileptiform then will have to stop Wellbutrin (started 6/21).\par - hold off on ASMs\par - 48h amb EEG\par - MRI brain with and without\par - RTC 6mos

## 2022-03-22 NOTE — PHYSICAL EXAM
[FreeTextEntry1] : neuro- alert and oriented x3, no dysphasia\par denies AH, VH currently\par STM-1.5/3 at 5mins\par Cn intact\par Motor- no drift, ffm ok\par sens gr intact\par gait nb and steady\par

## 2022-03-23 ENCOUNTER — TRANSCRIPTION ENCOUNTER (OUTPATIENT)
Age: 52
End: 2022-03-23

## 2022-03-23 ENCOUNTER — OUTPATIENT (OUTPATIENT)
Dept: OUTPATIENT SERVICES | Facility: HOSPITAL | Age: 52
LOS: 1 days | End: 2022-03-23
Payer: MEDICARE

## 2022-03-23 VITALS
SYSTOLIC BLOOD PRESSURE: 136 MMHG | HEIGHT: 72 IN | DIASTOLIC BLOOD PRESSURE: 88 MMHG | RESPIRATION RATE: 18 BRPM | TEMPERATURE: 98 F | OXYGEN SATURATION: 97 % | HEART RATE: 85 BPM | WEIGHT: 188.94 LBS

## 2022-03-23 VITALS — OXYGEN SATURATION: 98 % | RESPIRATION RATE: 16 BRPM | SYSTOLIC BLOOD PRESSURE: 133 MMHG | HEART RATE: 78 BPM

## 2022-03-23 DIAGNOSIS — K02.62 DENTAL CARIES ON SMOOTH SURFACE PENETRATING INTO DENTIN: ICD-10-CM

## 2022-03-23 DIAGNOSIS — K05.6 PERIODONTAL DISEASE, UNSPECIFIED: ICD-10-CM

## 2022-03-23 DIAGNOSIS — Z98.890 OTHER SPECIFIED POSTPROCEDURAL STATES: Chronic | ICD-10-CM

## 2022-03-23 PROCEDURE — D7210: CPT

## 2022-03-23 PROCEDURE — C1889: CPT

## 2022-03-23 PROCEDURE — C9399: CPT

## 2022-03-23 PROCEDURE — D7140: CPT

## 2022-03-23 PROCEDURE — D4341: CPT

## 2022-03-23 DEVICE — SURGICEL 2 X 14": Type: IMPLANTABLE DEVICE | Status: FUNCTIONAL

## 2022-03-23 RX ORDER — OMEPRAZOLE 10 MG/1
1 CAPSULE, DELAYED RELEASE ORAL
Qty: 0 | Refills: 0 | DISCHARGE

## 2022-03-23 RX ORDER — SODIUM CHLORIDE 9 MG/ML
1000 INJECTION, SOLUTION INTRAVENOUS
Refills: 0 | Status: DISCONTINUED | OUTPATIENT
Start: 2022-03-23 | End: 2022-03-30

## 2022-03-23 NOTE — ASU DISCHARGE PLAN (ADULT/PEDIATRIC) - ASU DC SPECIAL INSTRUCTIONSFT
Tylenol prn pain    comprehensive dental treatment under general anesthesia stage 1     patient can return to program on friday     resume all medications    no straw for two days, keep head elevated for the next two days and nights and see Dr Sheffield in one week at [facility]    exam. xrays periodontal treatment and restorative performed, continued restorative tx needed as there are severe dental caries Tylenol prn pain    comprehensive dental treatment under general anesthesia stage 1     patient can return to program on friday     resume all medications    no straw for two days, keep head elevated for the next two days and nights and see Dr Sheffield in one week at [facility]     continued restorative tx needed as there are severe dental caries

## 2022-03-23 NOTE — ASU PATIENT PROFILE, ADULT - FALL HARM RISK - UNIVERSAL INTERVENTIONS
Bed in lowest position, wheels locked, appropriate side rails in place/Call bell, personal items and telephone in reach/Instruct patient to call for assistance before getting out of bed or chair/Non-slip footwear when patient is out of bed/[place] to call system/Physically safe environment - no spills, clutter or unnecessary equipment/Purposeful Proactive Rounding/Room/bathroom lighting operational, light cord in reach

## 2022-03-23 NOTE — DISCHARGE NOTE NURSING/CASE MANAGEMENT/SOCIAL WORK - NSDCPEFALRISK_GEN_ALL_CORE
For information on Fall & Injury Prevention, visit: [email or web address] OR  [email or web address] OR  https://www.cdc.gov/steadi/patient.html

## 2022-03-23 NOTE — DISCHARGE NOTE NURSING/CASE MANAGEMENT/SOCIAL WORK - PATIENT PORTAL LINK FT
You can access the FollowMyHealth Patient Portal offered by  by registering at the following website: [email or web address]. By joining [employer]’s FollowMyHealth portal, you will also be able to view your health information using other applications (apps) compatible with our system.

## 2022-03-23 NOTE — ASU DISCHARGE PLAN (ADULT/PEDIATRIC) - NS MD DC FALL RISK RISK
For information on Fall & Injury Prevention, visit: [email or web address] OR  [email or web address] OR  https://www.cdc.gov/steadi/patient.html

## 2022-03-30 ENCOUNTER — OUTPATIENT (OUTPATIENT)
Dept: OUTPATIENT SERVICES | Facility: HOSPITAL | Age: 52
LOS: 1 days | End: 2022-03-30
Payer: MEDICARE

## 2022-03-30 ENCOUNTER — TRANSCRIPTION ENCOUNTER (OUTPATIENT)
Age: 52
End: 2022-03-30

## 2022-03-30 DIAGNOSIS — Z98.890 OTHER SPECIFIED POSTPROCEDURAL STATES: Chronic | ICD-10-CM

## 2022-03-30 DIAGNOSIS — Z11.52 ENCOUNTER FOR SCREENING FOR COVID-19: ICD-10-CM

## 2022-03-30 LAB — SARS-COV-2 RNA SPEC QL NAA+PROBE: SIGNIFICANT CHANGE UP

## 2022-03-30 PROCEDURE — U0003: CPT

## 2022-03-30 PROCEDURE — U0005: CPT

## 2022-03-30 PROCEDURE — C9803: CPT

## 2022-03-30 RX ORDER — LIDOCAINE HCL 20 MG/ML
0.2 VIAL (ML) INJECTION ONCE
Refills: 0 | Status: DISCONTINUED | OUTPATIENT
Start: 2022-03-30 | End: 2022-04-06

## 2022-03-30 RX ORDER — SODIUM CHLORIDE 9 MG/ML
3 INJECTION INTRAMUSCULAR; INTRAVENOUS; SUBCUTANEOUS EVERY 8 HOURS
Refills: 0 | Status: DISCONTINUED | OUTPATIENT
Start: 2022-03-30 | End: 2022-04-06

## 2022-03-31 ENCOUNTER — TRANSCRIPTION ENCOUNTER (OUTPATIENT)
Age: 52
End: 2022-03-31

## 2022-03-31 ENCOUNTER — OUTPATIENT (OUTPATIENT)
Dept: OUTPATIENT SERVICES | Facility: HOSPITAL | Age: 52
LOS: 1 days | End: 2022-03-31
Payer: MEDICARE

## 2022-03-31 VITALS
SYSTOLIC BLOOD PRESSURE: 132 MMHG | OXYGEN SATURATION: 95 % | DIASTOLIC BLOOD PRESSURE: 82 MMHG | HEART RATE: 73 BPM | RESPIRATION RATE: 16 BRPM

## 2022-03-31 VITALS
RESPIRATION RATE: 16 BRPM | HEIGHT: 72 IN | WEIGHT: 188.94 LBS | HEART RATE: 87 BPM | TEMPERATURE: 98 F | SYSTOLIC BLOOD PRESSURE: 123 MMHG | OXYGEN SATURATION: 97 % | DIASTOLIC BLOOD PRESSURE: 86 MMHG

## 2022-03-31 DIAGNOSIS — Z98.890 OTHER SPECIFIED POSTPROCEDURAL STATES: Chronic | ICD-10-CM

## 2022-03-31 DIAGNOSIS — K02.62 DENTAL CARIES ON SMOOTH SURFACE PENETRATING INTO DENTIN: ICD-10-CM

## 2022-03-31 DIAGNOSIS — K05.6 PERIODONTAL DISEASE, UNSPECIFIED: ICD-10-CM

## 2022-03-31 PROCEDURE — D4341: CPT

## 2022-03-31 PROCEDURE — D2394: CPT

## 2022-03-31 PROCEDURE — D2332: CPT

## 2022-03-31 PROCEDURE — C9399: CPT

## 2022-03-31 PROCEDURE — D2391: CPT

## 2022-03-31 PROCEDURE — D2393: CPT

## 2022-03-31 NOTE — ASU DISCHARGE PLAN (ADULT/PEDIATRIC) - ASU DC SPECIAL INSTRUCTIONSFT
Tylenol prn pain    comprehensive dental treatment under general anesthesia stage 2     patient can return to program on friday     resume all medications    see Dr Sheffield in one week at [facility] on 4/5 at 2:45 pm      restorative tx performed

## 2022-03-31 NOTE — ASU DISCHARGE PLAN (ADULT/PEDIATRIC) - NS MD DC FALL RISK RISK
For information on Fall & Injury Prevention, visit: [email or web address]-[email or web address] OR  [email or web address] OR  https://www.cdc.gov/steadi/patient.html

## 2022-03-31 NOTE — ASU PATIENT PROFILE, ADULT - FALL HARM RISK - PT AGE POPULATION HIDDEN
Patient is here. She says that she feels extremely fatigued. She also feels short of breath. She also has a dry cough worse in the mornings. Overall she does not feel good. She has not been taking both of her inhalers-DuoNeb or    Patient Active Problem List    Diagnosis Date Noted   â¢ Chronic obstructive pulmonary disease with acute lower respiratory infection (CMS/[facility]) 11/06/2018     Priority: Low   â¢ SOB (shortness of breath) 10/15/2018     Priority: Low   â¢ COPD exacerbation (CMS/[facility]) 02/13/2018     Priority: Low   â¢ Shortness of breath 02/13/2018     Priority: Low   â¢ Art's neuroma of third interspace of left foot 11/14/2017     Priority: Low   â¢ Chronic left hip pain 12/10/2014     Priority: Low   â¢ DVT prophylaxis 12/10/2014     Priority: Low   â¢ Status post hip replacement 12/10/2014     Priority: Low   â¢ Personal history of malignant melanoma of skin 11/05/2012     Priority: Low   â¢ Family history of early CAD 11/01/2012     Priority: Low   â¢ Hypovitaminosis D      Priority: Low   â¢ Hyperlipemia      Priority: Low   â¢ Hypertension      Priority: Low   â¢ GERD (gastroesophageal reflux disease)      Priority: Low   â¢ Abdominal pain, right lower quadrant 04/30/2001     Priority: Low   â¢ Allergic rhinitis, cause unspecified 04/30/2001     Priority: Low   â¢ Irritable bowel syndrome 04/30/2001     Priority: Low       OBJECTIVE:  The patient is a 65year old  female who appears healthy,alert,in no distress. BUILD:  Normal  LUNGS: clear to auscultation and percussion  HEART: PMI normal, regular rate and rhythm, S1 and S2 normal, no S3 or S4, with no gallops, murmurs or rubs and no clicks    ASSESSMENT: COPD exacerbation (CMS/[facility])  (primary encounter diagnosis)  Comment:  Restart DuoNeb and albuterol p.r.n.   Plan: CBC NO DIFFERENTIAL, COMPREHENSIVE METABOLIC         PANEL, LIPID PANEL WITH REFLEX            Essential hypertension  Comment:   Plan: CBC NO DIFFERENTIAL, COMPREHENSIVE METABOLIC PANEL, LIPID PANEL WITH REFLEX        Please continue on your current dose of medications.    Wean off the bisoprolol over the next 1 week mostly because of bradycardia    Abdominal pain, right lower quadrant  Comment:   Plan: CBC NO DIFFERENTIAL, COMPREHENSIVE METABOLIC         PANEL, LIPID PANEL WITH REFLEX            Gastroesophageal reflux disease without esophagitis  Comment:   Plan: CBC NO DIFFERENTIAL, COMPREHENSIVE METABOLIC         PANEL, LIPID PANEL WITH REFLEX        Increase the Prilosec/omeprazole to 40 mg-currently on 20 mg    Other fatigue  Comment:   Plan: THYROID STIMULATING HORMONE            Cough  Comment:   Plan: XR CHEST PA AND LATERAL Adult

## 2022-04-05 NOTE — ED ADULT TRIAGE NOTE - ESI TRIAGE ACUITY LEVEL, MLM
2 Doxepin Pregnancy And Lactation Text: This medication is Pregnancy Category C and it isn't known if it is safe during pregnancy. It is also excreted in breast milk and breast feeding isn't recommended.

## 2022-05-20 ENCOUNTER — EMERGENCY (EMERGENCY)
Facility: HOSPITAL | Age: 52
LOS: 1 days | Discharge: HOME | End: 2022-05-20
Attending: EMERGENCY MEDICINE | Admitting: EMERGENCY MEDICINE
Payer: MEDICARE

## 2022-05-20 VITALS
TEMPERATURE: 99 F | SYSTOLIC BLOOD PRESSURE: 102 MMHG | OXYGEN SATURATION: 94 % | RESPIRATION RATE: 16 BRPM | DIASTOLIC BLOOD PRESSURE: 76 MMHG | WEIGHT: 130.07 LBS | HEART RATE: 80 BPM

## 2022-05-20 VITALS
OXYGEN SATURATION: 95 % | TEMPERATURE: 98 F | SYSTOLIC BLOOD PRESSURE: 109 MMHG | HEART RATE: 82 BPM | RESPIRATION RATE: 16 BRPM | DIASTOLIC BLOOD PRESSURE: 67 MMHG

## 2022-05-20 DIAGNOSIS — Z98.890 OTHER SPECIFIED POSTPROCEDURAL STATES: Chronic | ICD-10-CM

## 2022-05-20 LAB
ALBUMIN SERPL ELPH-MCNC: 3.4 G/DL — SIGNIFICANT CHANGE UP (ref 3.3–5)
ALP SERPL-CCNC: 88 U/L — SIGNIFICANT CHANGE UP (ref 40–120)
ALT FLD-CCNC: 17 U/L — SIGNIFICANT CHANGE UP (ref 12–78)
ANION GAP SERPL CALC-SCNC: 6 MMOL/L — SIGNIFICANT CHANGE UP (ref 5–17)
AST SERPL-CCNC: 23 U/L — SIGNIFICANT CHANGE UP (ref 15–37)
BASOPHILS # BLD AUTO: 0.03 K/UL — SIGNIFICANT CHANGE UP (ref 0–0.2)
BASOPHILS NFR BLD AUTO: 0.4 % — SIGNIFICANT CHANGE UP (ref 0–2)
BILIRUB SERPL-MCNC: 1.1 MG/DL — SIGNIFICANT CHANGE UP (ref 0.2–1.2)
BUN SERPL-MCNC: 15 MG/DL — SIGNIFICANT CHANGE UP (ref 7–23)
CALCIUM SERPL-MCNC: 8.8 MG/DL — SIGNIFICANT CHANGE UP (ref 8.5–10.1)
CHLORIDE SERPL-SCNC: 106 MMOL/L — SIGNIFICANT CHANGE UP (ref 96–108)
CO2 SERPL-SCNC: 28 MMOL/L — SIGNIFICANT CHANGE UP (ref 22–31)
CREAT SERPL-MCNC: 0.95 MG/DL — SIGNIFICANT CHANGE UP (ref 0.5–1.3)
EGFR: 96 ML/MIN/1.73M2 — SIGNIFICANT CHANGE UP
EOSINOPHIL # BLD AUTO: 0.2 K/UL — SIGNIFICANT CHANGE UP (ref 0–0.5)
EOSINOPHIL NFR BLD AUTO: 2.7 % — SIGNIFICANT CHANGE UP (ref 0–6)
GLUCOSE SERPL-MCNC: 87 MG/DL — SIGNIFICANT CHANGE UP (ref 70–99)
HCT VFR BLD CALC: 41 % — SIGNIFICANT CHANGE UP (ref 39–50)
HGB BLD-MCNC: 13.9 G/DL — SIGNIFICANT CHANGE UP (ref 13–17)
IMM GRANULOCYTES NFR BLD AUTO: 0.3 % — SIGNIFICANT CHANGE UP (ref 0–1.5)
LYMPHOCYTES # BLD AUTO: 0.74 K/UL — LOW (ref 1–3.3)
LYMPHOCYTES # BLD AUTO: 9.9 % — LOW (ref 13–44)
MCHC RBC-ENTMCNC: 30.8 PG — SIGNIFICANT CHANGE UP (ref 27–34)
MCHC RBC-ENTMCNC: 33.9 GM/DL — SIGNIFICANT CHANGE UP (ref 32–36)
MCV RBC AUTO: 90.7 FL — SIGNIFICANT CHANGE UP (ref 80–100)
MONOCYTES # BLD AUTO: 0.52 K/UL — SIGNIFICANT CHANGE UP (ref 0–0.9)
MONOCYTES NFR BLD AUTO: 7 % — SIGNIFICANT CHANGE UP (ref 2–14)
NEUTROPHILS # BLD AUTO: 5.94 K/UL — SIGNIFICANT CHANGE UP (ref 1.8–7.4)
NEUTROPHILS NFR BLD AUTO: 79.7 % — HIGH (ref 43–77)
NRBC # BLD: 0 /100 WBCS — SIGNIFICANT CHANGE UP (ref 0–0)
PLATELET # BLD AUTO: 193 K/UL — SIGNIFICANT CHANGE UP (ref 150–400)
POTASSIUM SERPL-MCNC: 4 MMOL/L — SIGNIFICANT CHANGE UP (ref 3.5–5.3)
POTASSIUM SERPL-SCNC: 4 MMOL/L — SIGNIFICANT CHANGE UP (ref 3.5–5.3)
PROT SERPL-MCNC: 6.5 G/DL — SIGNIFICANT CHANGE UP (ref 6–8.3)
RBC # BLD: 4.52 M/UL — SIGNIFICANT CHANGE UP (ref 4.2–5.8)
RBC # FLD: 13.7 % — SIGNIFICANT CHANGE UP (ref 10.3–14.5)
SODIUM SERPL-SCNC: 140 MMOL/L — SIGNIFICANT CHANGE UP (ref 135–145)
WBC # BLD: 7.45 K/UL — SIGNIFICANT CHANGE UP (ref 3.8–10.5)
WBC # FLD AUTO: 7.45 K/UL — SIGNIFICANT CHANGE UP (ref 3.8–10.5)

## 2022-05-20 PROCEDURE — 74176 CT ABD & PELVIS W/O CONTRAST: CPT | Mod: 26,MA

## 2022-05-20 PROCEDURE — 80053 COMPREHEN METABOLIC PANEL: CPT

## 2022-05-20 PROCEDURE — 99284 EMERGENCY DEPT VISIT MOD MDM: CPT | Mod: 25

## 2022-05-20 PROCEDURE — 36415 COLL VENOUS BLD VENIPUNCTURE: CPT

## 2022-05-20 PROCEDURE — 99284 EMERGENCY DEPT VISIT MOD MDM: CPT | Mod: FS

## 2022-05-20 PROCEDURE — 85025 COMPLETE CBC W/AUTO DIFF WBC: CPT

## 2022-05-20 PROCEDURE — 74176 CT ABD & PELVIS W/O CONTRAST: CPT | Mod: MA

## 2022-05-20 RX ORDER — SODIUM CHLORIDE 9 MG/ML
1000 INJECTION INTRAMUSCULAR; INTRAVENOUS; SUBCUTANEOUS ONCE
Refills: 0 | Status: COMPLETED | OUTPATIENT
Start: 2022-05-20 | End: 2022-05-20

## 2022-05-20 RX ADMIN — SODIUM CHLORIDE 1000 MILLILITER(S): 9 INJECTION INTRAMUSCULAR; INTRAVENOUS; SUBCUTANEOUS at 17:45

## 2022-05-20 NOTE — ED PROVIDER NOTE - CARE PROVIDER_API CALL
Rodrigo Cannon ()  Internal Medicine  [address] [place] [address]  Phone: [phone]  Fax: [phone]  Follow Up Time:

## 2022-05-20 NOTE — ED PROVIDER NOTE - NS ED ATTENDING STATEMENT MOD
This was a shared visit with the GÓMEZ. I reviewed and verified the documentation and independently performed the documented:

## 2022-05-20 NOTE — ED ADULT NURSE NOTE - OBJECTIVE STATEMENT
Patient arrives alert and oriented to person and place c/o LLQ pain starting 2+ days ago, was seen at [facility] and states they gave him a medication that did not help. Patient denies nausea vomiting diarrhea, fever, chills.

## 2022-05-20 NOTE — ED PROVIDER NOTE - NSFOLLOWUPINSTRUCTIONS_ED_ALL_ED_FT
Follow up with GI  return to er for any worsening symptoms                                                                                         Abdominal Pain, Adult      Many things can cause belly (abdominal) pain. Most times, belly pain is not dangerous. Many cases of belly pain can be watched and treated at home. Sometimes, though, belly pain is serious. Your doctor will try to find the cause of your belly pain.      Follow these instructions at home:     Medicines     •Take over-the-counter and prescription medicines only as told by your doctor.      • Do not take medicines that help you poop (laxatives) unless told by your doctor.      General instructions     •Watch your belly pain for any changes.      •Drink enough fluid to keep your pee (urine) pale yellow.      •Keep all follow-up visits as told by your doctor. This is important.        Contact a doctor if:    •Your belly pain changes or gets worse.      •You are not hungry, or you lose weight without trying.      •You are having trouble pooping (constipated) or have watery poop (diarrhea) for more than 2–3 days.      •You have pain when you pee or poop.      •Your belly pain wakes you up at night.      •Your pain gets worse with meals, after eating, or with certain foods.      •You are vomiting and cannot keep anything down.      •You have a fever.      •You have blood in your pee.        Get help right away if:    •Your pain does not go away as soon as your doctor says it should.      •You cannot stop vomiting.      •Your pain is only in areas of your belly, such as the right side or the left lower part of the belly.      •You have bloody or black poop, or poop that looks like tar.      •You have very bad pain, cramping, or bloating in your belly.    •You have signs of not having enough fluid or water in your body (dehydration), such as:  •Dark pee, very little pee, or no pee.      •Cracked lips.      •Dry mouth.      •Sunken eyes.      •Sleepiness.      •Weakness.        •You have trouble breathing or chest pain.        Summary    •Many cases of belly pain can be watched and treated at home.      •Watch your belly pain for any changes.      •Take over-the-counter and prescription medicines only as told by your doctor.      •Contact a doctor if your belly pain changes or gets worse.      •Get help right away if you have very bad pain, cramping, or bloating in your belly.      This information is not intended to replace advice given to you by your health care provider. Make sure you discuss any questions you have with your health care provider.      Document Revised: 04/27/2020 Document Reviewed: 04/27/2020    Elsevier Patient Education © 2022 Elsevier Inc.

## 2022-05-20 NOTE — ED ADULT NURSE NOTE - CHIEF COMPLAINT QUOTE
c/o left sided abdominal pain with on and off nausea * 3 days- was seen at [facility] for the same reason * 3 days- as per ems patient is alert, oriented *2 which is his baseline

## 2022-05-20 NOTE — ED PROVIDER NOTE - PATIENT PORTAL LINK FT
You can access the FollowMyHealth Patient Portal offered by [facility] by registering at the following website: [email or web address]. By joining [employer]’s FollowMyHealth portal, you will also be able to view your health information using other applications (apps) compatible with our system.

## 2022-05-20 NOTE — ED PROVIDER NOTE - CLINICAL SUMMARY MEDICAL DECISION MAKING FREE TEXT BOX
52 y/o M with c/o LLQ abd pain x few days.  recent ED visit at [facility].  labs, CT r/o divertic

## 2022-05-20 NOTE — ED PROVIDER NOTE - OBJECTIVE STATEMENT
Pt is a 51 male BIBEMS from group home for abdominal pain for a few days no nvd + constipation last bm 2 days ago denies any fever chills urinary symptoms as per ems as seen at [facility] for similar complaint.

## 2022-05-20 NOTE — ED ADULT TRIAGE NOTE - CHIEF COMPLAINT QUOTE
c/o left sided abdominal pain with on and off nausea * 3 days- was seen at [facility] for the same reason * 3 days- as per ems patient is alert, oriented *2 which is his baseline

## 2022-05-31 ENCOUNTER — APPOINTMENT (OUTPATIENT)
Dept: NEUROLOGY | Facility: CLINIC | Age: 52
End: 2022-05-31
Payer: MEDICARE

## 2022-05-31 PROCEDURE — 95816 EEG AWAKE AND DROWSY: CPT

## 2022-06-01 PROCEDURE — 95708 EEG WO VID EA 12-26HR UNMNTR: CPT

## 2022-06-01 PROCEDURE — 95719 EEG PHYS/QHP EA INCR W/O VID: CPT

## 2022-06-01 PROCEDURE — 95700 EEG CONT REC W/VID EEG TECH: CPT

## 2022-06-02 ENCOUNTER — NON-APPOINTMENT (OUTPATIENT)
Age: 52
End: 2022-06-02

## 2022-06-02 DIAGNOSIS — Z01.812 ENCOUNTER FOR PREPROCEDURAL LABORATORY EXAMINATION: ICD-10-CM

## 2022-06-02 RX ORDER — POLYETHYLENE GLYCOL 3350 AND ELECTROLYTES WITH LEMON FLAVOR 236; 22.74; 6.74; 5.86; 2.97 G/4L; G/4L; G/4L; G/4L; G/4L
236 POWDER, FOR SOLUTION ORAL
Qty: 1 | Refills: 0 | Status: ACTIVE | COMMUNITY
Start: 2022-06-02 | End: 1900-01-01

## 2022-06-03 ENCOUNTER — NON-APPOINTMENT (OUTPATIENT)
Age: 52
End: 2022-06-03

## 2022-06-14 ENCOUNTER — APPOINTMENT (OUTPATIENT)
Dept: NEUROLOGY | Facility: CLINIC | Age: 52
End: 2022-06-14
Payer: MEDICARE

## 2022-06-14 VITALS
SYSTOLIC BLOOD PRESSURE: 127 MMHG | HEIGHT: 72 IN | HEART RATE: 89 BPM | DIASTOLIC BLOOD PRESSURE: 85 MMHG | WEIGHT: 190 LBS | BODY MASS INDEX: 25.73 KG/M2 | RESPIRATION RATE: 17 BRPM

## 2022-06-14 PROCEDURE — 99214 OFFICE O/P EST MOD 30 MIN: CPT

## 2022-06-14 NOTE — HISTORY OF PRESENT ILLNESS
[FreeTextEntry1] : cc- r/o seizures\par \par HPI- Since last visit no more of the self reported nocturnal episodes.\par Had  a nl routine and amb EEG.\par \par Only c/o is mild depression.\par \par \par meds - Haldol decanoate 100mg/ml \par cogentin \par sertraline 100 mg\par omeprazole\par wellbutrin xl 150mg\par trazadone 50 mg hs

## 2022-06-14 NOTE — PHYSICAL EXAM
[FreeTextEntry1] : neuro- alert and oriented x3, no dysphasia\par denies AH, VH currently\par STM-1.5/3 at 5mins\par Cn intact\par Motor- no drift, ffm ok\par sens gr intact\par gait nb and steady\par . \par

## 2022-06-14 NOTE — ASSESSMENT
[FreeTextEntry1] : A/p- 53 y/o man with schizo-affective /Bipolar d/o with 6 self- reported nocturnal events that he believes are seizures. None witnessed or self reported in past six months. Routine EEG and amb EEG without epileptiform activity but still needs MRI brain.\par - hold off on ASMs\par - MRI brain with and without- still needs pre-auth\par - RTC 6mos. \par

## 2022-07-01 ENCOUNTER — APPOINTMENT (OUTPATIENT)
Dept: MRI IMAGING | Facility: CLINIC | Age: 52
End: 2022-07-01

## 2022-07-03 NOTE — ED ADULT NURSE NOTE - NS_NURSE_DISC_TEACHING_YN_ED_ALL_ED
Additional support for cold symptoms:     Normal Saline Nasal Sprays/Sinus Rinse/Neti Pot- Use 1 to 2 times per day to help with nasal congestion, dryness, postnasal drip, and runny nose. This is especially helpful before bedtime. Humidifiers/Vaporizers- Adds moisture to the air, which helps ease coughing and congestion. Mucus tends to pull in the extra moisture, which thins it out and makes it easier to expel from the body. Vicks VapoRub, Mentholatum. ..=Topical Antitussives-Contain a mixture of camphor, menthol, and eucalyptus for cough and congestion. When these products are inhaled, they create a local anesthetic sensation and a sense of improved airflow. Warm Steam Showers/Baths- The warm mist from the steamy bathroom relaxes the airways, loosens mucus, and allows for easier breathing. A nice big pot of soup on the stove helps too! Honey- Helps to relieve cough and relieves throat irritation. Use 1/2 to 2 teaspoons (tsp). It's great to add to hot (decaffeinated) tea=more humidity! Warning:  Do not give honey to children under one year old. Gargle with warm salt water- Helps relieve sore throat. Mix 1/4 to 1/2 teaspoon (tsp) salt with 1 cup (8 ounces) of warm water. Vitamin D3-The vitamin is needed to boost our immune system and help our bodies fight off infection. Extra supplements during the winter and times of illness are recommended. Try 2,000 to 4,000 international units daily. Rest and Push Fluids   Use Gravity-Elevating the head above the heart helps decrease congestion, which is primarily caused by swollen blood vessels in the lining of the nose. Chloraseptic Spray - to ease throat pain and cough, contains mild numbing agent   Over The Counter Medications: Follow the directions on each specific package   Ibuprofen= Advil or Motrin, pain reliever, anti-inflammatory, and fever reducer  Acetaminophen=Tylenol Pain reliever and fever reducer.   Guaifenesin=Mucinex- Mucus thinning and expectorant. Guaifenesin and Dextromethorphan=Mucinex DM- Mucus thinning expectorant and cough suppressant. Diphenhydramine=Benadryl- An antihistamine that helps with runny nose, allergies, post nasal drip, and cough). Warning:  Usually causes drowsiness.  Good for before bed Yes

## 2022-07-14 ENCOUNTER — APPOINTMENT (OUTPATIENT)
Dept: GASTROENTEROLOGY | Facility: HOSPITAL | Age: 52
End: 2022-07-14

## 2022-07-25 ENCOUNTER — OUTPATIENT (OUTPATIENT)
Dept: OUTPATIENT SERVICES | Facility: HOSPITAL | Age: 52
LOS: 1 days | End: 2022-07-25
Payer: MEDICARE

## 2022-07-25 VITALS
HEIGHT: 72 IN | TEMPERATURE: 99 F | DIASTOLIC BLOOD PRESSURE: 86 MMHG | SYSTOLIC BLOOD PRESSURE: 117 MMHG | HEART RATE: 102 BPM | WEIGHT: 179.02 LBS | OXYGEN SATURATION: 96 % | RESPIRATION RATE: 14 BRPM

## 2022-07-25 DIAGNOSIS — D12.6 BENIGN NEOPLASM OF COLON, UNSPECIFIED: ICD-10-CM

## 2022-07-25 DIAGNOSIS — K02.62 DENTAL CARIES ON SMOOTH SURFACE PENETRATING INTO DENTIN: ICD-10-CM

## 2022-07-25 DIAGNOSIS — R56.9 UNSPECIFIED CONVULSIONS: ICD-10-CM

## 2022-07-25 DIAGNOSIS — K05.6 PERIODONTAL DISEASE, UNSPECIFIED: ICD-10-CM

## 2022-07-25 DIAGNOSIS — Z98.890 OTHER SPECIFIED POSTPROCEDURAL STATES: Chronic | ICD-10-CM

## 2022-07-25 DIAGNOSIS — Z01.818 ENCOUNTER FOR OTHER PREPROCEDURAL EXAMINATION: ICD-10-CM

## 2022-07-25 DIAGNOSIS — Z92.89 PERSONAL HISTORY OF OTHER MEDICAL TREATMENT: Chronic | ICD-10-CM

## 2022-07-25 LAB
ALBUMIN SERPL ELPH-MCNC: 4.4 G/DL — SIGNIFICANT CHANGE UP (ref 3.3–5)
ALP SERPL-CCNC: 116 U/L — SIGNIFICANT CHANGE UP (ref 40–120)
ALT FLD-CCNC: 6 U/L — LOW (ref 10–45)
ANION GAP SERPL CALC-SCNC: 13 MMOL/L — SIGNIFICANT CHANGE UP (ref 5–17)
AST SERPL-CCNC: 13 U/L — SIGNIFICANT CHANGE UP (ref 10–40)
BILIRUB SERPL-MCNC: 1 MG/DL — SIGNIFICANT CHANGE UP (ref 0.2–1.2)
BUN SERPL-MCNC: 10 MG/DL — SIGNIFICANT CHANGE UP (ref 7–23)
CALCIUM SERPL-MCNC: 9.2 MG/DL — SIGNIFICANT CHANGE UP (ref 8.4–10.5)
CHLORIDE SERPL-SCNC: 101 MMOL/L — SIGNIFICANT CHANGE UP (ref 96–108)
CO2 SERPL-SCNC: 22 MMOL/L — SIGNIFICANT CHANGE UP (ref 22–31)
CREAT SERPL-MCNC: 0.86 MG/DL — SIGNIFICANT CHANGE UP (ref 0.5–1.3)
EGFR: 103 ML/MIN/1.73M2 — SIGNIFICANT CHANGE UP
GLUCOSE SERPL-MCNC: 90 MG/DL — SIGNIFICANT CHANGE UP (ref 70–99)
HCT VFR BLD CALC: 46.2 % — SIGNIFICANT CHANGE UP (ref 39–50)
HGB BLD-MCNC: 15.7 G/DL — SIGNIFICANT CHANGE UP (ref 13–17)
MCHC RBC-ENTMCNC: 29.8 PG — SIGNIFICANT CHANGE UP (ref 27–34)
MCHC RBC-ENTMCNC: 34 GM/DL — SIGNIFICANT CHANGE UP (ref 32–36)
MCV RBC AUTO: 87.7 FL — SIGNIFICANT CHANGE UP (ref 80–100)
NRBC # BLD: 0 /100 WBCS — SIGNIFICANT CHANGE UP (ref 0–0)
PLATELET # BLD AUTO: 227 K/UL — SIGNIFICANT CHANGE UP (ref 150–400)
POTASSIUM SERPL-MCNC: 4.3 MMOL/L — SIGNIFICANT CHANGE UP (ref 3.5–5.3)
POTASSIUM SERPL-SCNC: 4.3 MMOL/L — SIGNIFICANT CHANGE UP (ref 3.5–5.3)
PROT SERPL-MCNC: 8 G/DL — SIGNIFICANT CHANGE UP (ref 6–8.3)
RBC # BLD: 5.27 M/UL — SIGNIFICANT CHANGE UP (ref 4.2–5.8)
RBC # FLD: 13.4 % — SIGNIFICANT CHANGE UP (ref 10.3–14.5)
SODIUM SERPL-SCNC: 136 MMOL/L — SIGNIFICANT CHANGE UP (ref 135–145)
WBC # BLD: 8.93 K/UL — SIGNIFICANT CHANGE UP (ref 3.8–10.5)
WBC # FLD AUTO: 8.93 K/UL — SIGNIFICANT CHANGE UP (ref 3.8–10.5)

## 2022-07-25 PROCEDURE — 80053 COMPREHEN METABOLIC PANEL: CPT

## 2022-07-25 PROCEDURE — G0463: CPT

## 2022-07-25 PROCEDURE — 85027 COMPLETE CBC AUTOMATED: CPT

## 2022-07-25 RX ORDER — HALOPERIDOL DECANOATE 100 MG/ML
0 INJECTION INTRAMUSCULAR
Qty: 0 | Refills: 0 | DISCHARGE

## 2022-07-25 RX ORDER — HALOPERIDOL DECANOATE 100 MG/ML
1 INJECTION INTRAMUSCULAR
Qty: 0 | Refills: 0 | DISCHARGE

## 2022-07-25 RX ORDER — BENZTROPINE MESYLATE 1 MG
2 TABLET ORAL
Qty: 0 | Refills: 0 | DISCHARGE

## 2022-07-25 NOTE — H&P PST ADULT - OTHER CARE PROVIDERS
Ame Raza   [phone]    [facility] Guidance and Counseling  Services ---group home --- Ame Raza   [phone] [facility] Guidance and Counseling, neuro Dr. Ellis Pacio [phone]

## 2022-07-25 NOTE — H&P PST ADULT - HISTORY OF PRESENT ILLNESS
53 yo male. PMH seizure disorder, excessive ETOH use (last use 2017), schizoaffective disorder (pt is a resident of [facility] due to h/o substance use and psych disorder).  h/o colon adenoma, s/p colon polypectomy via colonoscopy, presents to PST today scheduled for follow up colonoscopy on 8/4.  covid test scheduled on 8/1 at [facility].   **group home counselor reports that in the past 2 years, the staff had not noticed pt has seizure activities, however pt reports to have intermittent seizures, last seizure 2 weeks ago, pt was evaluated by neuro, EEG done earlier this year- result WNL, pt is scheduled to have brain MRI on 7/26, instructed pt and group home counselor (Ms. Dong) to obtain neuro preop evaluation.**  **discussed pt's case with PACU manager Mayra, pt is A+Ox3, per Ms. Dong, pt always signs all his consents, he lives in [facility] due to h/o substance use and psych disorder. On the day of surgery, pt will be picked up by group home counselor, Karlos, [phone]**

## 2022-07-25 NOTE — H&P PST ADULT - NSICDXPASTSURGICALHX_GEN_ALL_CORE_FT
PAST SURGICAL HISTORY:  History of dental surgery under general anesthesia early 2022    S/P colonoscopy 2018  12/2021  early 2021

## 2022-07-25 NOTE — H&P PST ADULT - NSICDXPASTMEDICALHX_GEN_ALL_CORE_FT
PAST MEDICAL HISTORY:  Alcohol abuse last drink 2017    Bipolar illness hospitalized for depression and suicidal thoughs 2017 ([facility])    Current smoker     Schizophrenia     Seizures stated had seizures once a month, and not on any seizure med, have no neurologist , last seizures 2 weeks ago 2/2022, grand mal     PAST MEDICAL HISTORY:  Alcohol abuse last drink 2017    Bipolar illness hospitalized for depression and suicidal thoughs 2017 ([facility])    Current smoker     Schizoaffective disorder     Seizures stated had seizures once a month, and not on any seizure med, have no neurologist , last seizures 2 weeks ago 2/2022, grand mal

## 2022-07-25 NOTE — H&P PST ADULT - FALL HARM RISK - UNIVERSAL INTERVENTIONS
Bed in lowest position, wheels locked, appropriate side rails in place/Call bell, personal items and telephone in reach/Instruct patient to call for assistance before getting out of bed or chair/Non-slip footwear when patient is out of bed/[place] to call system/Physically safe environment - no spills, clutter or unnecessary equipment/Purposeful Proactive Rounding/Room/bathroom lighting operational, light cord in reach

## 2022-07-25 NOTE — H&P PST ADULT - PROBLEM SELECTOR PLAN 1
continue anti-epiletic medications continue antiepileptic medications  follow up with MRI reseult  pt will obtain neuro preop evaluation

## 2022-07-25 NOTE — H&P PST ADULT - NOTES
lives in group home lives in group home [facility] lives in group home [facility]

## 2022-07-26 ENCOUNTER — APPOINTMENT (OUTPATIENT)
Dept: MRI IMAGING | Facility: CLINIC | Age: 52
End: 2022-07-26

## 2022-07-26 PROBLEM — F25.9 SCHIZOAFFECTIVE DISORDER, UNSPECIFIED: Chronic | Status: ACTIVE | Noted: 2022-07-25

## 2022-08-01 ENCOUNTER — OUTPATIENT (OUTPATIENT)
Dept: OUTPATIENT SERVICES | Facility: HOSPITAL | Age: 52
LOS: 1 days | End: 2022-08-01
Payer: MEDICARE

## 2022-08-01 DIAGNOSIS — Z98.890 OTHER SPECIFIED POSTPROCEDURAL STATES: Chronic | ICD-10-CM

## 2022-08-01 DIAGNOSIS — Z92.89 PERSONAL HISTORY OF OTHER MEDICAL TREATMENT: Chronic | ICD-10-CM

## 2022-08-01 DIAGNOSIS — Z11.52 ENCOUNTER FOR SCREENING FOR COVID-19: ICD-10-CM

## 2022-08-01 PROBLEM — F20.9 SCHIZOPHRENIA, UNSPECIFIED: Chronic | Status: INACTIVE | Noted: 2019-04-22 | Resolved: 2022-07-25

## 2022-08-01 LAB — SARS-COV-2 RNA SPEC QL NAA+PROBE: DETECTED

## 2022-08-01 PROCEDURE — U0005: CPT

## 2022-08-01 PROCEDURE — C9803: CPT

## 2022-08-01 PROCEDURE — U0003: CPT

## 2022-08-04 ENCOUNTER — APPOINTMENT (OUTPATIENT)
Dept: GASTROENTEROLOGY | Facility: HOSPITAL | Age: 52
End: 2022-08-04

## 2022-08-09 ENCOUNTER — APPOINTMENT (OUTPATIENT)
Dept: MRI IMAGING | Facility: CLINIC | Age: 52
End: 2022-08-09

## 2022-08-09 PROCEDURE — 70553 MRI BRAIN STEM W/O & W/DYE: CPT

## 2022-08-09 PROCEDURE — A9585: CPT

## 2022-08-11 ENCOUNTER — NON-APPOINTMENT (OUTPATIENT)
Age: 52
End: 2022-08-11

## 2022-09-07 ENCOUNTER — OUTPATIENT (OUTPATIENT)
Dept: OUTPATIENT SERVICES | Facility: HOSPITAL | Age: 52
LOS: 1 days | End: 2022-09-07
Payer: MEDICARE

## 2022-09-07 VITALS
TEMPERATURE: 98 F | SYSTOLIC BLOOD PRESSURE: 121 MMHG | OXYGEN SATURATION: 97 % | HEART RATE: 93 BPM | DIASTOLIC BLOOD PRESSURE: 87 MMHG | WEIGHT: 186.07 LBS | HEIGHT: 72 IN | RESPIRATION RATE: 17 BRPM

## 2022-09-07 DIAGNOSIS — R56.9 UNSPECIFIED CONVULSIONS: ICD-10-CM

## 2022-09-07 DIAGNOSIS — D12.6 BENIGN NEOPLASM OF COLON, UNSPECIFIED: ICD-10-CM

## 2022-09-07 DIAGNOSIS — Z92.89 PERSONAL HISTORY OF OTHER MEDICAL TREATMENT: Chronic | ICD-10-CM

## 2022-09-07 DIAGNOSIS — Z98.890 OTHER SPECIFIED POSTPROCEDURAL STATES: Chronic | ICD-10-CM

## 2022-09-07 PROCEDURE — 85027 COMPLETE CBC AUTOMATED: CPT

## 2022-09-07 PROCEDURE — G0463: CPT

## 2022-09-07 PROCEDURE — 80053 COMPREHEN METABOLIC PANEL: CPT

## 2022-09-07 RX ORDER — ACETAMINOPHEN 500 MG
2 TABLET ORAL
Qty: 0 | Refills: 0 | DISCHARGE

## 2022-09-07 RX ORDER — PREGABALIN 225 MG/1
1 CAPSULE ORAL
Qty: 0 | Refills: 0 | DISCHARGE

## 2022-09-07 NOTE — H&P PST ADULT - OTHER CARE PROVIDERS
Ame Raza   [phone] [facility] Guidance and Counseling, neuro Dr. Caleb Cobos

## 2022-09-07 NOTE — H&P PST ADULT - HEALTH CARE MAINTENANCE
Quality 131: Pain Assessment And Follow-Up: Pain assessment using a standardized tool is documented as negative, no follow-up plan required Quality 226: Preventive Care And Screening: Tobacco Use: Screening And Cessation Intervention: Patient screened for tobacco use and is an ex/non-smoker Quality 431: Preventive Care And Screening: Unhealthy Alcohol Use - Screening: Patient screened for unhealthy alcohol use using a single question and scores less than 2 times per year Detail Level: Detailed Quality 130: Documentation Of Current Medications In The Medical Record: Current Medications Documented Quality 110: Preventive Care And Screening: Influenza Immunization: Influenza immunization was not ordered or administered, reason not given moderna vaccine x 3

## 2022-09-07 NOTE — H&P PST ADULT - HISTORY OF PRESENT ILLNESS
51 yo male. PMH seizure disorder, excessive ETOH use (last use 2017), schizoaffective disorder (pt is a resident of [facility] due to h/o substance use and psych disorder).  h/o colon adenoma, s/p colon polypectomy via colonoscopy, presents to PST today scheduled for follow up colonoscopy on 9/13  covid test scheduled on 9/10  at [facility].   **group [place] counselor reports that in the past 2 years, the staff had not noticed pt has seizure activities, however pt reports to have intermittent seizures, last seizure July 2022, pt was evaluated by neuro, EEG done earlier this year- result WNL, pt had scheduled brain MRI on 7/26, instructed pt and group home to forward  recent  neuro preop evaluation.**  ** pt is A+Ox3,  pt always signs all his consents, he lives in care home due to h/o substance use and psych disorder. On the day of surgery, pt will be picked up by group [place] counselor.    *COVID  denies foreign travel  denies s/s   denies known exposure [employer] x 3 last 12/28/21 does not have card 51 yo male. PMH seizure disorder, excessive ETOH use (last use 2017), schizoaffective disorder (pt is a resident of [facility] due to h/o substance use and psych disorder).  h/o colon adenoma, s/p colon polypectomy via colonoscopy, presents to PST today scheduled for follow up colonoscopy on 9/13  covid test scheduled on 9/10  at [facility].   **group [place] counselor reports that in the past 2 years, the staff had not noticed pt has seizure activities, however pt reports to have intermittent seizures, last seizure July 2022, pt was evaluated by neuro, EEG done earlier this year- result WNL, pt had scheduled brain MRI on 7/26, instructed pt and group home to forward  recent  neuro preop evaluation.**  ** pt is A+Ox3,  pt always signs all his consents, he lives in [facility] due to h/o substance use and psych disorder. On the day of surgery, pt will be picked up by group [place] counselor.    *COVID  denies foreign travel  denies s/s   denies known exposure [employer]a x 3 last 12/28/21 does not have card  +PCR in [facility]IE 8/1/22c does not need repeat PCR

## 2022-09-07 NOTE — H&P PST ADULT - NSICDXPASTSURGICALHX_GEN_ALL_CORE_FT
PAST SURGICAL HISTORY:  History of dental surgery under general anesthesia early 2022    S/P colonoscopy 2018 12/2021  early 2021 benign polyps per pt

## 2022-09-07 NOTE — H&P PST ADULT - NSICDXPASTMEDICALHX_GEN_ALL_CORE_FT
PAST MEDICAL HISTORY:  Alcohol abuse last drink 2017    Bipolar illness hospitalized for depression and suicidal thoughs 2017 ([facility])    Current smoker     Schizoaffective disorder     Seizures stated had seizures once a month, and not on any seizure med, have no neurologist , last seizures 2 weeks ago 2/2022, grand mal

## 2022-09-09 ENCOUNTER — TRANSCRIPTION ENCOUNTER (OUTPATIENT)
Age: 52
End: 2022-09-09

## 2022-09-12 RX ORDER — POLYETHYLENE GLYCOL 3350 AND ELECTROLYTES WITH LEMON FLAVOR 236; 22.74; 6.74; 5.86; 2.97 G/4L; G/4L; G/4L; G/4L; G/4L
236 POWDER, FOR SOLUTION ORAL
Qty: 1 | Refills: 0 | Status: ACTIVE | COMMUNITY
Start: 2022-09-12 | End: 1900-01-01

## 2022-09-22 ENCOUNTER — OUTPATIENT (OUTPATIENT)
Dept: OUTPATIENT SERVICES | Facility: HOSPITAL | Age: 52
LOS: 1 days | End: 2022-09-22
Payer: MEDICARE

## 2022-09-22 ENCOUNTER — APPOINTMENT (OUTPATIENT)
Dept: GASTROENTEROLOGY | Facility: HOSPITAL | Age: 52
End: 2022-09-22

## 2022-09-22 ENCOUNTER — RESULT REVIEW (OUTPATIENT)
Age: 52
End: 2022-09-22

## 2022-09-22 ENCOUNTER — TRANSCRIPTION ENCOUNTER (OUTPATIENT)
Age: 52
End: 2022-09-22

## 2022-09-22 VITALS
DIASTOLIC BLOOD PRESSURE: 76 MMHG | HEART RATE: 73 BPM | RESPIRATION RATE: 19 BRPM | SYSTOLIC BLOOD PRESSURE: 123 MMHG | HEIGHT: 72 IN | TEMPERATURE: 97 F | OXYGEN SATURATION: 97 % | WEIGHT: 179.9 LBS

## 2022-09-22 VITALS
RESPIRATION RATE: 15 BRPM | OXYGEN SATURATION: 96 % | HEART RATE: 56 BPM | SYSTOLIC BLOOD PRESSURE: 137 MMHG | DIASTOLIC BLOOD PRESSURE: 77 MMHG

## 2022-09-22 DIAGNOSIS — Z98.890 OTHER SPECIFIED POSTPROCEDURAL STATES: Chronic | ICD-10-CM

## 2022-09-22 DIAGNOSIS — D12.6 BENIGN NEOPLASM OF COLON, UNSPECIFIED: ICD-10-CM

## 2022-09-22 DIAGNOSIS — Z92.89 PERSONAL HISTORY OF OTHER MEDICAL TREATMENT: Chronic | ICD-10-CM

## 2022-09-22 PROCEDURE — 45385 COLONOSCOPY W/LESION REMOVAL: CPT

## 2022-09-22 PROCEDURE — 45385 COLONOSCOPY W/LESION REMOVAL: CPT | Mod: GC

## 2022-09-22 PROCEDURE — 88305 TISSUE EXAM BY PATHOLOGIST: CPT

## 2022-09-22 PROCEDURE — 88305 TISSUE EXAM BY PATHOLOGIST: CPT | Mod: 26

## 2022-09-22 DEVICE — NET RETRV ROT ROTH 2.5MMX230CM: Type: IMPLANTABLE DEVICE | Status: FUNCTIONAL

## 2022-09-22 RX ORDER — SODIUM CHLORIDE 9 MG/ML
500 INJECTION, SOLUTION INTRAVENOUS
Refills: 0 | Status: DISCONTINUED | OUTPATIENT
Start: 2022-09-22 | End: 2022-10-07

## 2022-09-22 NOTE — PRE-ANESTHESIA EVALUATION ADULT - MALLAMPATI CLASS
[facility] - FollowUp Visit  Date of Service: 10/02/19    Mari Sahni is here for follow up of dyslipidemia    HPI  Pertinent Interval History since last visit:   On 9/17/19, pt called c/o of fatigue and general illness, which she attributed to Crestor 5 mg q Sunday and Wednesday. Since then, she has reduced her Crestor dose back to once weekly. S/s have resolved at this appt.  Current Prescription Lipid Lowering Medications - including dose:   Statin: Crestor 5 mg once weekly  Non-Statin: None  Current Lipid Lowering and Related Supplements:   None  Any Current Side Effects Potentially Related to Lipid Lowering therapy?   Fatigue/illness associated with Crestor twice weekly  Current Adherence to Lipid Lowering Therapies:  Complete  Any Previous History of Statin Intolerance?   Yes, Details: Crestor dose greater than 5 mg; Atorvastatin > severe muscle pain in lower extremities  Baseline Lipids Prior to Treatment:   12/6/2018 07:49   Cholesterol,Tot 331 (H)   Triglycerides 190 (H)   HDL 56    (H)     SOCIAL HISTORY  Social History     Tobacco Use   Smoking Status Never Smoker   Smokeless Tobacco Never Used      Change in weight: Stable  Exercise habits: no regular exercise program   Diet: low carbohydrate, increased vegetable intake    ROS  Physical Exam    DATA REVIEW  Most Recent Lipid Panel:   Lab Results   Component Value Date    CHOLSTRLTOT 246 (H) 09/24/2019    CHOLSTRLTOT 308 (H) 03/06/2019    TRIGLYCERIDE 176 (H) 09/24/2019    TRIGLYCERIDE 406 (H) 03/06/2019    HDL 58 09/24/2019    HDL 52 03/06/2019    LDL see below 03/06/2019       Other Pertinent Blood Work:   Lab Results   Component Value Date    SODIUM 142 09/24/2019    POTASSIUM 3.9 09/24/2019    CHLORIDE 105 09/24/2019    CO2 29 09/24/2019    ANION 8.0 09/24/2019    GLUCOSE 86 09/24/2019    BUN 30 (H) 09/24/2019    CREATININE 0.87 09/24/2019    CALCIUM 9.6 09/24/2019    ASTSGOT 18 09/24/2019    ALTSGPT 14 09/24/2019    ALKPHOSPHAT 81  09/24/2019    TBILIRUBIN 0.6 09/24/2019    ALBUMIN 4.7 09/24/2019    AGRATIO 2.1 09/24/2019    TSHULTRASEN 2.600 03/06/2019       Other:  NA    Recent Imaging Studies:    CAC 6/11/18     Coronary calcification:  LMA - 0.0  LCX - 0.0  LAD - 0.0  RCA - 0.0  PDA - 0.0    Calcium score:  0.0    ASSESSMENT AND PLAN  Patient Type, check all that apply:   Primary Prevention  Established Atherosclerotic Cardiovascular Disease (ASCVD)  No  Other Established (non-atherosclerotic) Vascular Disease, if Present:    None  Evidence of Heterozygous Familial Hypercholesterolemia (FH): Very likely; was dx'd with dyslipidemia in her 20s despite being relatively healthy; father and mother both have dyslipidemia  ACC/AHA Indication for Statin Therapy, dorothea all that apply:   LDL-C at baseline >190 mg/dl: Indication for High intensity statin    Calculated Risk for ASCVD, if applicable:  N/A  Other Significant Risk Markers, if any, dorothea all that apply:  None  National Lipid Association (NLA) Goal (if applicable):  LDL-C:   <100 mg/dL   Non-HDL: <130 mg/dl  Lifestyle Recommendations From Today’s Visit:   Eating Plan: Concentrate on  continuing to increase vegetable intake and Exercise: continue to perform aerobic exercise 3x/week  Statin Recommendations from Today's Visit  Increase Crestor 5 mg q weekly back to twice weekly  - Lipid panel demonstrated slight improvement from March 2019   - Pt is willing to try going back to Crestor twice weekly as she also believes the fatigue/general illness may have been a viral infection  Non-Statin Medications Recommendations from Today’s Visit:   None  Indication for PCSK9 Inhibitor, if applicable:  Not currently indicated  Supplements Recommended at this visit:  None  Recommendations for Other Cardiovascular Risk Factors, dorothea all that apply:   Diabetes/Impaired Fasting Glucose- A1C was 5.8; decrease carbohydrate intake and monitor A1C q 3 mo     Reviewed labs with pt. LDL and non-HDL have improved  since December 2018, but still not at goal. According to pt's NMR, her small LDL-P and LDL size places her at increased CVD risk, however her HDL-P is normal, which decreases her CVD risk.    After discussing benefits vs risks of a statin, pt is willing to go back to Crestor 5 mg twice weekly. Pt to contact our clinic if she experiences side effects with the increased statin dosing.    Studies Ordered at Todays Visit:  None   Blood Work Ordered At Today’s visit:   Lipid panel, vitamin D  Follow-Up:   4 weeks    Stephany Ross, PharmD    CC:  John Goetz M.D.   Class II - visualization of the soft palate, fauces, and uvula

## 2022-09-22 NOTE — PRE PROCEDURE NOTE - PRE PROCEDURE EVALUATION
Attending Physician:   Dr. Mora                  Procedure: Colonoscopy     Indication for Procedure: Surveillance of prior resection site   ________________________________________________________  PAST MEDICAL & SURGICAL HISTORY:  Alcohol abuse  last drink 2017      Bipolar illness  hospitalized for depression and suicidal thoughs 2017 ([facility])      Seizures  stated had seizures once a month, and not on any seizure med, have no neurologist , last seizures 2 weeks ago 2/2022, grand mal      Current smoker      Schizoaffective disorder      S/P colonoscopy  2018 12/2021  early 2021 benign polyps per pt      History of dental surgery  under general anesthesia early 2022        ALLERGIES:  No Known Allergies    HOME MEDICATIONS:  benztropine 2 mg oral tablet: 1 tab(s) orally 2 times a day  folic acid 1 mg oral tablet: 1 tab(s) orally once a day (at bedtime)  haloperidol decanoate 100 mg/mL intramuscular solution: every 4 weeks  omeprazole 40 mg oral delayed release capsule: 1 cap(s) orally once a day  sertraline 100 mg oral tablet: 2 tab(s) orally once a day  traZODone 50 mg oral tablet: 1 tab(s) orally once a day (at bedtime), As Needed  Vitamin B12 1000 mcg oral tablet: 1 tab(s) orally once a day  Vitamin D3 125 mcg (5000 intl units) oral capsule: 1 cap(s) orally once a day  Wellbutrin: 150 milligram(s) orally once a day    AICD/PPM: [ ] yes   [x ] no    PERTINENT LAB DATA:                      PHYSICAL EXAMINATION:    Height (cm): 182.9  Weight (kg): 81.6  BMI (kg/m2): 24.4  BSA (m2): 2.04T(C): 36.3  HR: 73  BP: 123/76  RR: 19  SpO2: 97%    Constitutional: NAD    Neck:  No JVD  Respiratory: CTAB/L  Cardiovascular: RRR  Gastrointestinal: BS+, soft, NT/ND  Extremities: No peripheral edema  Neurological: A/O x 3        COMMENTS:    The patient is a suitable candidate for the planned procedure unless box checked [ ]  No, explain:

## 2022-09-22 NOTE — ASU DISCHARGE PLAN (ADULT/PEDIATRIC) - NS MD DC FALL RISK RISK
For information on Fall & Injury Prevention, visit: [email or web address] OR  [email or web address] OR  https://www.cdc.gov/steadi/patient.html

## 2022-09-22 NOTE — ASU PATIENT PROFILE, ADULT - PRO INTERPRETER NEED 2
English Hatchet Flap Text: The defect edges were debeveled with a #15c scalpel blade.  Given the location of the defect, shape of the defect and the proximity to free margins a hatchet flap was deemed most appropriate.  Using a sterile surgical marker, an appropriate hatchet flap was drawn incorporating the defect and placing the expected incisions within the relaxed skin tension lines where possible.    The area thus outlined was incised deep to adipose tissue with a #15 scalpel blade.  The skin margins were undermined to an appropriate distance in all directions utilizing iris scissors.

## 2022-09-26 LAB — SURGICAL PATHOLOGY STUDY: SIGNIFICANT CHANGE UP

## 2023-01-01 NOTE — H&P PST ADULT - DOES PATIENT HAVE ADVANCE DIRECTIVE
Diaper rash for over a month. Walk-in prescribed nystatin. Did help, but now coming back with more surface covered.    No

## 2023-03-15 NOTE — ED BEHAVIORAL HEALTH ASSESSMENT NOTE - NS ED BHA BENZODIAZEPINES
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  Patient admitted post fall  Examined in the presence of his wife who supplemented his history  Has baseline gait instability  He uses a walker but I still ended up falling backwards with no precipitating cause per wife  Noted to have an L2 compression fracture which is to be managed conservatively  Neurosurgery has recommended TLSO brace and outpatient follow-up in the clinic  Tylenol 1 g 3 times daily to be scheduled  Continue oxycodone as needed  He has underlying history of prostate cancer and is not a candidate for e-stim or diathermy  Family is hoping he can avoid surgery-no indication given Per chart review  Recheck labs  Continue with PT/OT-has gait instability  Monitor INRs-r/c 2.5  Has seizure disorder with no breakthrough seizures reported  Senna S added to his regimen due to constipation concern    History     Patient is a very pleasant 77 year old male who is admitted to TCU  Patient presented to the emergency room after he fell.  No loss of consciousness reported no head injury.  Compression fracture of L2 vertebrae noted on exam and pain management optimized and he has been discharged to the TCU for strengthening and rehab.  At baseline he has gait instability and uses a walker.      Past Medical & Surgical History     PAST MEDICAL HISTORY:   Past Medical History:   Diagnosis Date     Benign prostatic hyperplasia without lower urinary tract symptoms      Essential hypertension      History of basal cell  "carcinoma     s/p resection     History of deep venous thrombosis 1991    s/p [facility] IVC clip placement in 1991     Long term current use of anticoagulant therapy     warfarin     Lupus anticoagulant syndrome (H)      Meningioma (H)      Other forms of systemic lupus erythematosus (H)      Seizure disorder (H)      Stage 3b chronic kidney disease (H)       PAST SURGICAL HISTORY:   has a past surgical history that includes appendectomy and Cholecystectomy.      Past Social History     Reviewed,  reports that he has never smoked. He has never used smokeless tobacco. He reports that he does not currently use alcohol. He reports that he does not use drugs.    Family History     Reviewed, and family history includes Cerebrovascular Disease in his mother; Pancreatic Cancer in his brother.    Medication List     Current Outpatient Medications   Medication     acetaminophen (TYLENOL) 325 MG tablet     cyanocobalamin (VITAMIN B-12) 1000 MCG tablet     hydroxychloroquine (PLAQUENIL) 200 MG tablet     lamoTRIgine (LAMICTAL) 100 MG tablet     lamoTRIgine (LAMICTAL) 100 MG tablet     lidocaine (LMX4) 4 % external cream     losartan (COZAAR) 25 MG tablet     multivitamin, therapeutic (THERA-VIT) TABS tablet     oxyCODONE (ROXICODONE) 5 MG tablet     predniSONE (DELTASONE) 5 MG tablet     vitamin D3 (CHOLECALCIFEROL) 50 mcg (2000 units) tablet     warfarin ANTICOAGULANT (COUMADIN) 5 MG tablet     warfarin ANTICOAGULANT (COUMADIN) 7.5 MG tablet     No current facility-administered medications for this visit.          Allergies     Allergies   Allergen Reactions     Atorvastatin GI Disturbance and Other (See Comments)     abd pain  abd pain  abd pain       Xarelto [Rivaroxaban] Unknown     \"Didn't work\"       Review of Systems   A comprehensive review of 14 systems was done. Pertinent findings noted here and in history of present illness. All the rest negative.  Constitutional: Negative.  Negative for fever, chills, he has  " "activity change, appetite change and fatigue.   HENT: Negative for congestion and facial swelling.    Eyes: Negative for photophobia, redness and visual disturbance.   Respiratory: Negative for cough and chest tightness.    Cardiovascular: Negative for chest pain, palpitations and leg swelling.   Gastrointestinal: Negative for nausea, diarrhea, constipation, blood in stool and abdominal distention.  Reporting no BM for 4 days  Genitourinary: Negative.    Musculoskeletal: Reporting severe pain in his back with difficulty moving  He did take some pain pills this morning and feels better  Per wife he has baseline gait instability unrelated to his underlying medical problems  He uses a walker at baseline but still fell at home  Skin: Negative.    Neurological: Negative for dizziness, tremors, syncope, weakness, light-headedness and headaches.   Hematological: Does not bruise/bleed easily.   Psychiatric/Behavioral: Negative.  Anxious due to pain      Physical Exam   /58   Pulse 105   Temp 97.2  F (36.2  C)   Resp 16   Ht 1.778 m (5' 10\")   Wt 68 kg (150 lb)   SpO2 93%   BMI 21.52 kg/m       Constitutional: Oriented to person, place, and time and appears well-developed.   HEENT:  Normocephalic and atraumatic.  Eyes: Conjunctivae and EOM are normal. Pupils are equal, round, and reactive to light. No discharge.  No scleral icterus. Nose normal. Mouth/Throat: Oropharynx is clear and moist. No oropharyngeal exudate.    NECK: Normal range of motion. Neck supple. No JVD present. No tracheal deviation present. No thyromegaly present.   CARDIOVASCULAR: Normal rate, regular rhythm and intact distal pulses.  Exam reveals no gallop and no friction rub.  Systolic murmur present.  PULMONARY: Effort normal and breath sounds normal. No respiratory distress.No Wheezing or rales.  ABDOMEN: Soft. Bowel sounds are normal. No distension and no mass.  There is no tenderness. There is no rebound and no guarding. No " HSM.  MUSCULOSKELETAL: Normal range of motion. Mild kyphosis, has L-spine tenderness.  LYMPH NODES: Has no cervical, supraclavicular, axillary and groin adenopathy.   NEUROLOGICAL: Alert and oriented to person, place, and time. No cranial nerve deficit.  Normal muscle tone. Coordination normal.   GENITOURINARY: Deferred exam.  SKIN: Skin is warm and dry. No rash noted. No erythema. No pallor.   EXTREMITIES: No cyanosis, no clubbing, no edema. No Deformity.  PSYCHIATRIC: Normal mood, affect and behavior. Anxious due to pain      Lab Results     Recent Results (from the past 240 hour(s))   Asymptomatic COVID-19 Virus (Coronavirus) by PCR Nasopharyngeal    Collection Time: 03/13/23 12:33 PM    Specimen: Nasopharyngeal; Swab   Result Value Ref Range    SARS CoV2 PCR Negative Negative   INR    Collection Time: 03/13/23 12:35 PM   Result Value Ref Range    INR 2.23 (H) 0.85 - 1.15   Basic metabolic panel    Collection Time: 03/13/23 12:35 PM   Result Value Ref Range    Sodium 140 136 - 145 mmol/L    Potassium 4.1 3.4 - 5.3 mmol/L    Chloride 105 98 - 107 mmol/L    Carbon Dioxide (CO2) 26 22 - 29 mmol/L    Anion Gap 9 7 - 15 mmol/L    Urea Nitrogen 24.7 (H) 8.0 - 23.0 mg/dL    Creatinine 1.53 (H) 0.67 - 1.17 mg/dL    Calcium 8.8 8.8 - 10.2 mg/dL    Glucose 103 (H) 70 - 99 mg/dL    GFR Estimate 46 (L) >60 mL/min/1.73m2   Hepatic function panel    Collection Time: 03/13/23 12:35 PM   Result Value Ref Range    Protein Total 7.0 6.4 - 8.3 g/dL    Albumin 3.5 3.5 - 5.2 g/dL    Bilirubin Total 0.7 <=1.2 mg/dL    Alkaline Phosphatase 212 (H) 40 - 129 U/L    AST 39 10 - 50 U/L    ALT 34 10 - 50 U/L    Bilirubin Direct <0.20 0.00 - 0.30 mg/dL   Magnesium    Collection Time: 03/13/23 12:35 PM   Result Value Ref Range    Magnesium 2.0 1.7 - 2.3 mg/dL   CBC with platelets and differential    Collection Time: 03/13/23 12:35 PM   Result Value Ref Range    WBC Count 6.3 4.0 - 11.0 10e3/uL    RBC Count 3.30 (L) 4.40 - 5.90 10e6/uL     Hemoglobin 10.1 (L) 13.3 - 17.7 g/dL    Hematocrit 32.2 (L) 40.0 - 53.0 %    MCV 98 78 - 100 fL    MCH 30.6 26.5 - 33.0 pg    MCHC 31.4 (L) 31.5 - 36.5 g/dL    RDW 14.3 10.0 - 15.0 %    Platelet Count 80 (L) 150 - 450 10e3/uL    % Neutrophils 81 %    % Lymphocytes 11 %    % Monocytes 7 %    % Eosinophils 0 %    % Basophils 0 %    % Immature Granulocytes 1 %    NRBCs per 100 WBC 0 <1 /100    Absolute Neutrophils 5.1 1.6 - 8.3 10e3/uL    Absolute Lymphocytes 0.7 (L) 0.8 - 5.3 10e3/uL    Absolute Monocytes 0.5 0.0 - 1.3 10e3/uL    Absolute Eosinophils 0.0 0.0 - 0.7 10e3/uL    Absolute Basophils 0.0 0.0 - 0.2 10e3/uL    Absolute Immature Granulocytes 0.0 <=0.4 10e3/uL    Absolute NRBCs 0.0 10e3/uL   Basic metabolic panel    Collection Time: 03/14/23  7:14 AM   Result Value Ref Range    Sodium 140 136 - 145 mmol/L    Potassium 4.6 3.4 - 5.3 mmol/L    Chloride 107 98 - 107 mmol/L    Carbon Dioxide (CO2) 25 22 - 29 mmol/L    Anion Gap 8 7 - 15 mmol/L    Urea Nitrogen 27.2 (H) 8.0 - 23.0 mg/dL    Creatinine 1.50 (H) 0.67 - 1.17 mg/dL    Calcium 8.7 (L) 8.8 - 10.2 mg/dL    Glucose 92 70 - 99 mg/dL    GFR Estimate 47 (L) >60 mL/min/1.73m2   CBC with platelets    Collection Time: 03/14/23  7:14 AM   Result Value Ref Range    WBC Count 5.5 4.0 - 11.0 10e3/uL    RBC Count 3.43 (L) 4.40 - 5.90 10e6/uL    Hemoglobin 10.5 (L) 13.3 - 17.7 g/dL    Hematocrit 33.7 (L) 40.0 - 53.0 %    MCV 98 78 - 100 fL    MCH 30.6 26.5 - 33.0 pg    MCHC 31.2 (L) 31.5 - 36.5 g/dL    RDW 14.3 10.0 - 15.0 %    Platelet Count 82 (L) 150 - 450 10e3/uL   INR    Collection Time: 03/14/23  7:14 AM   Result Value Ref Range    INR 2.36 (H) 0.85 - 1.15   INR    Collection Time: 03/15/23  7:36 AM   Result Value Ref Range    INR 2.33 (H) 0.85 - 1.15   Extra Purple Top Tube    Collection Time: 03/15/23  7:36 AM   Result Value Ref Range    Hold Specimen JIC    Extra Red Top Tube    Collection Time: 03/15/23  7:36 AM   Result Value Ref Range    Hold Specimen JIC             Electronically signed by    Haylie Vega MD                        None known

## 2023-09-15 DIAGNOSIS — Z15.09 GENETIC SUSCEPTIBILITY TO OTHER MALIGNANT NEOPLASM: ICD-10-CM

## 2023-09-15 DIAGNOSIS — Z86.010 PERSONAL HISTORY OF COLONIC POLYPS: ICD-10-CM

## 2023-09-15 RX ORDER — SODIUM SULFATE, POTASSIUM SULFATE AND MAGNESIUM SULFATE 1.6; 3.13; 17.5 G/177ML; G/177ML; G/177ML
17.5-3.13-1.6 SOLUTION ORAL
Qty: 1 | Refills: 0 | Status: ACTIVE | COMMUNITY
Start: 2023-09-15 | End: 1900-01-01

## 2023-09-27 ENCOUNTER — OUTPATIENT (OUTPATIENT)
Dept: OUTPATIENT SERVICES | Facility: HOSPITAL | Age: 53
LOS: 1 days | End: 2023-09-27
Payer: MEDICARE

## 2023-09-27 VITALS
HEIGHT: 72 IN | WEIGHT: 188.05 LBS | OXYGEN SATURATION: 96 % | TEMPERATURE: 98 F | SYSTOLIC BLOOD PRESSURE: 124 MMHG | RESPIRATION RATE: 18 BRPM | DIASTOLIC BLOOD PRESSURE: 86 MMHG | HEART RATE: 104 BPM

## 2023-09-27 DIAGNOSIS — Z86.010 PERSONAL HISTORY OF COLONIC POLYPS: ICD-10-CM

## 2023-09-27 DIAGNOSIS — Z98.890 OTHER SPECIFIED POSTPROCEDURAL STATES: Chronic | ICD-10-CM

## 2023-09-27 DIAGNOSIS — Z92.89 PERSONAL HISTORY OF OTHER MEDICAL TREATMENT: Chronic | ICD-10-CM

## 2023-09-27 DIAGNOSIS — Z01.818 ENCOUNTER FOR OTHER PREPROCEDURAL EXAMINATION: ICD-10-CM

## 2023-09-27 DIAGNOSIS — Z15.09 GENETIC SUSCEPTIBILITY TO OTHER MALIGNANT NEOPLASM: ICD-10-CM

## 2023-09-27 DIAGNOSIS — K63.5 POLYP OF COLON: ICD-10-CM

## 2023-09-27 LAB
ANION GAP SERPL CALC-SCNC: 12 MMOL/L — SIGNIFICANT CHANGE UP (ref 5–17)
BUN SERPL-MCNC: 17 MG/DL — SIGNIFICANT CHANGE UP (ref 7–23)
CALCIUM SERPL-MCNC: 9.4 MG/DL — SIGNIFICANT CHANGE UP (ref 8.4–10.5)
CHLORIDE SERPL-SCNC: 104 MMOL/L — SIGNIFICANT CHANGE UP (ref 96–108)
CO2 SERPL-SCNC: 23 MMOL/L — SIGNIFICANT CHANGE UP (ref 22–31)
CREAT SERPL-MCNC: 0.91 MG/DL — SIGNIFICANT CHANGE UP (ref 0.5–1.3)
EGFR: 100 ML/MIN/1.73M2 — SIGNIFICANT CHANGE UP
GLUCOSE SERPL-MCNC: 113 MG/DL — HIGH (ref 70–99)
HCT VFR BLD CALC: 46.4 % — SIGNIFICANT CHANGE UP (ref 39–50)
HGB BLD-MCNC: 15.9 G/DL — SIGNIFICANT CHANGE UP (ref 13–17)
MCHC RBC-ENTMCNC: 30.2 PG — SIGNIFICANT CHANGE UP (ref 27–34)
MCHC RBC-ENTMCNC: 34.3 GM/DL — SIGNIFICANT CHANGE UP (ref 32–36)
MCV RBC AUTO: 88.2 FL — SIGNIFICANT CHANGE UP (ref 80–100)
NRBC # BLD: 0 /100 WBCS — SIGNIFICANT CHANGE UP (ref 0–0)
PLATELET # BLD AUTO: 187 K/UL — SIGNIFICANT CHANGE UP (ref 150–400)
POTASSIUM SERPL-MCNC: 3.7 MMOL/L — SIGNIFICANT CHANGE UP (ref 3.5–5.3)
POTASSIUM SERPL-SCNC: 3.7 MMOL/L — SIGNIFICANT CHANGE UP (ref 3.5–5.3)
RBC # BLD: 5.26 M/UL — SIGNIFICANT CHANGE UP (ref 4.2–5.8)
RBC # FLD: 13.6 % — SIGNIFICANT CHANGE UP (ref 10.3–14.5)
SODIUM SERPL-SCNC: 139 MMOL/L — SIGNIFICANT CHANGE UP (ref 135–145)
WBC # BLD: 9.65 K/UL — SIGNIFICANT CHANGE UP (ref 3.8–10.5)
WBC # FLD AUTO: 9.65 K/UL — SIGNIFICANT CHANGE UP (ref 3.8–10.5)

## 2023-09-27 PROCEDURE — 80048 BASIC METABOLIC PNL TOTAL CA: CPT

## 2023-09-27 PROCEDURE — G0463: CPT

## 2023-09-27 PROCEDURE — 85027 COMPLETE CBC AUTOMATED: CPT

## 2023-09-27 RX ORDER — SERTRALINE 25 MG/1
2 TABLET, FILM COATED ORAL
Qty: 0 | Refills: 0 | DISCHARGE

## 2023-09-27 RX ORDER — PREGABALIN 225 MG/1
1 CAPSULE ORAL
Qty: 0 | Refills: 0 | DISCHARGE

## 2023-09-27 RX ORDER — OMEPRAZOLE 10 MG/1
1 CAPSULE, DELAYED RELEASE ORAL
Qty: 0 | Refills: 0 | DISCHARGE

## 2023-09-27 RX ORDER — CHOLECALCIFEROL (VITAMIN D3) 125 MCG
1 CAPSULE ORAL
Qty: 0 | Refills: 0 | DISCHARGE

## 2023-09-27 RX ORDER — HALOPERIDOL DECANOATE 100 MG/ML
0 INJECTION INTRAMUSCULAR
Qty: 0 | Refills: 0 | DISCHARGE

## 2023-09-27 RX ORDER — BUPROPION HYDROCHLORIDE 150 MG/1
150 TABLET, EXTENDED RELEASE ORAL
Qty: 0 | Refills: 0 | DISCHARGE

## 2023-09-27 RX ORDER — FOLIC ACID 0.8 MG
1 TABLET ORAL
Qty: 0 | Refills: 0 | DISCHARGE

## 2023-09-27 RX ORDER — TRAZODONE HCL 50 MG
1 TABLET ORAL
Qty: 0 | Refills: 0 | DISCHARGE

## 2023-09-27 RX ORDER — BENZTROPINE MESYLATE 1 MG
1 TABLET ORAL
Qty: 0 | Refills: 0 | DISCHARGE

## 2023-09-27 NOTE — H&P PST ADULT - PROBLEM SELECTOR PLAN 1
Plan for colonoscopy on 10/12/23 with Dr. Mora.  PST labs sent   Pre procedure instructions discussed

## 2023-09-27 NOTE — H&P PST ADULT - NSICDXPASTMEDICALHX_GEN_ALL_CORE_FT
PAST MEDICAL HISTORY:  Alcohol abuse last drink 2017    Bipolar illness hospitalized for depression and suicidal thoughs 2017 ([facility])    Colon polyps     Current smoker     Schizoaffective disorder     Seizures stated had seizures once a month, and not on any seizure med, have no neurologist , last seizures 2 weeks ago 2/2022, grand mal

## 2023-09-27 NOTE — H&P PST ADULT - NSICDXPASTSURGICALHX_GEN_ALL_CORE_FT
PAST SURGICAL HISTORY:  History of dental surgery under general anesthesia early 2022    S/P colon polypectomy     S/P colonoscopy 2018 12/2021  early 2021 benign polyps per pt

## 2023-09-27 NOTE — H&P PST ADULT - ASSESSMENT
DASI score: 9.89  DASI activity:  Very active, no limitations  Loose teeth or dentures: Denies  Airway: MP3

## 2023-10-12 ENCOUNTER — APPOINTMENT (OUTPATIENT)
Dept: GASTROENTEROLOGY | Facility: HOSPITAL | Age: 53
End: 2023-10-12

## 2023-10-12 ENCOUNTER — RESULT REVIEW (OUTPATIENT)
Age: 53
End: 2023-10-12

## 2023-10-12 ENCOUNTER — OUTPATIENT (OUTPATIENT)
Dept: OUTPATIENT SERVICES | Facility: HOSPITAL | Age: 53
LOS: 1 days | End: 2023-10-12
Payer: MEDICARE

## 2023-10-12 ENCOUNTER — TRANSCRIPTION ENCOUNTER (OUTPATIENT)
Age: 53
End: 2023-10-12

## 2023-10-12 VITALS
OXYGEN SATURATION: 100 % | WEIGHT: 190.04 LBS | SYSTOLIC BLOOD PRESSURE: 120 MMHG | HEART RATE: 62 BPM | DIASTOLIC BLOOD PRESSURE: 75 MMHG | TEMPERATURE: 98 F | HEIGHT: 72 IN | RESPIRATION RATE: 20 BRPM

## 2023-10-12 VITALS
DIASTOLIC BLOOD PRESSURE: 69 MMHG | HEART RATE: 74 BPM | SYSTOLIC BLOOD PRESSURE: 117 MMHG | RESPIRATION RATE: 17 BRPM | OXYGEN SATURATION: 100 %

## 2023-10-12 DIAGNOSIS — Z92.89 PERSONAL HISTORY OF OTHER MEDICAL TREATMENT: Chronic | ICD-10-CM

## 2023-10-12 DIAGNOSIS — Z98.890 OTHER SPECIFIED POSTPROCEDURAL STATES: Chronic | ICD-10-CM

## 2023-10-12 DIAGNOSIS — Z86.010 PERSONAL HISTORY OF COLONIC POLYPS: ICD-10-CM

## 2023-10-12 DIAGNOSIS — Z15.09 GENETIC SUSCEPTIBILITY TO OTHER MALIGNANT NEOPLASM: ICD-10-CM

## 2023-10-12 PROCEDURE — 45385 COLONOSCOPY W/LESION REMOVAL: CPT | Mod: GC

## 2023-10-12 PROCEDURE — 45385 COLONOSCOPY W/LESION REMOVAL: CPT

## 2023-10-12 PROCEDURE — 88305 TISSUE EXAM BY PATHOLOGIST: CPT | Mod: 26

## 2023-10-12 PROCEDURE — 88305 TISSUE EXAM BY PATHOLOGIST: CPT

## 2023-10-12 DEVICE — NET RETRV ROT ROTH 2.5MMX230CM: Type: IMPLANTABLE DEVICE | Status: FUNCTIONAL

## 2023-10-12 RX ORDER — SODIUM CHLORIDE 9 MG/ML
500 INJECTION INTRAMUSCULAR; INTRAVENOUS; SUBCUTANEOUS
Refills: 0 | Status: COMPLETED | OUTPATIENT
Start: 2023-10-12 | End: 2023-10-12

## 2023-10-12 RX ADMIN — SODIUM CHLORIDE 30 MILLILITER(S): 9 INJECTION INTRAMUSCULAR; INTRAVENOUS; SUBCUTANEOUS at 14:45

## 2023-10-12 NOTE — ASU DISCHARGE PLAN (ADULT/PEDIATRIC) - NS MD DC FALL RISK RISK
For information on Fall & Injury Prevention, visit: [email or web address] OR  [email or web address] OR  https://www.cdc.gov/steadi/patient.html

## 2023-10-12 NOTE — ASU PATIENT PROFILE, ADULT - FALL HARM RISK - UNIVERSAL INTERVENTIONS
Bed in lowest position, wheels locked, appropriate side rails in place/Call bell, personal items and telephone in reach/Instruct patient to call for assistance before getting out of bed or chair/Non-slip footwear when patient is out of bed/[place] to call system/Physically safe environment - no spills, clutter or unnecessary equipment/Purposeful Proactive Rounding/Room/bathroom lighting operational, light cord in reach

## 2023-10-12 NOTE — ASU DISCHARGE PLAN (ADULT/PEDIATRIC) - NSDISCH_BIOPSY_ENDO_ALL_CORE_FT
If you had a biopsy, you should not take aspirin or aspirin like products for the next 10 days unless instructed to do so by your doctor. If you had a biopsy, check with your doctor before taking any blood thinners such as warfarin (Coumadin). Also, another way I would cope with my stresses would be remove myself from the situations.

## 2023-10-12 NOTE — PRE PROCEDURE NOTE - PRE PROCEDURE EVALUATION
Attending Physician:   Dr. Mora    Procedure: COlonoscopy     Indication for Procedure: CRC screening, hx of colon polyp    ________________________________________________________  PAST MEDICAL & SURGICAL HISTORY:  Alcohol abuse  last drink 2017      Bipolar illness  hospitalized for depression and suicidal thoughs 2017 ([facility])      Seizures  stated had seizures once a month, and not on any seizure med, have no neurologist , last seizures 2 weeks ago 2/2022, grand mal      Current smoker      Schizoaffective disorder      Colon polyps      S/P colonoscopy  2018 12/2021  early 2021 benign polyps per pt      History of dental surgery  under general anesthesia early 2022      S/P colon polypectomy        ALLERGIES:  No Known Allergies    HOME MEDICATIONS:  benztropine 2 mg oral tablet: 1 tab(s) orally 2 times a day  folic acid 1 mg oral tablet: 1 tab(s) orally once a day (at bedtime)  haloperidol decanoate 100 mg/mL intramuscular solution: every 4 weeks  omeprazole 40 mg oral delayed release capsule: 1 cap(s) orally once a day  sertraline 100 mg oral tablet: 2 tab(s) orally once a day  traZODone 50 mg oral tablet: 1 tab(s) orally once a day (at bedtime), As Needed  Vitamin B12 1000 mcg oral tablet: 1 tab(s) orally once a day  Vitamin D3 125 mcg (5000 intl units) oral capsule: 1 cap(s) orally once a day  Wellbutrin: 150 milligram(s) orally once a day    AICD/PPM: [ ] yes   [x ] no    PERTINENT LAB DATA:                      PHYSICAL EXAMINATION:    Height (cm): 182.9  Weight (kg): 86.2  BMI (kg/m2): 25.8  BSA (m2): 2.08T(C): 36.4  HR: 62  BP: 120/75  RR: 20  SpO2: 100%    Constitutional: NAD  HEENT: PERRLA, EOMI,    Neck:  No JVD  Respiratory: CTAB/L  Cardiovascular: S1 and S2  Gastrointestinal: BS+, soft, NT/ND  Extremities: No peripheral edema  Neurological: A/O x 3, no focal deficits  Psychiatric: Normal mood, normal affect  Skin: No rashes    ASA Class: I [ ]  II [ ]  III [ x]  IV [ ]    COMMENTS:    The patient is a suitable candidate for the planned procedure unless box checked [ ]  No, explain:    I explained the risks (bleeding, infection, perforation, , CV risk, anesthesia risk)/b/a with the patient. The patient agrees to proceed. Patient was able to explain procedure today. He was provided opportunity to ask question in preprocedure bay 8.

## 2023-10-12 NOTE — ASU PATIENT PROFILE, ADULT - MEDICATION HERBAL REMEDIES, PROFILE
Spoke with patient via phone for follow up anticoagulation visit.   Last INR on 12/20/21 was 1.5 after holding warfarin 4 days.  Dose maintained.   Today's INR is 1.2 and is below goal range.    Current warfarin total weekly dose of 17.5 mg verified.  Informed the INR result is below therapeutic range and instructed to increase current dose per protocol. Discussed dose and return date of 12/31/21 for next INR. See Anticoagulation flowsheet.    DR. Riaz Mcmillan is in the office today supervising the treatment. Note forwarded for review.    Call your physician immediately if you notice any of the following symptoms of a blood clot:   Sudden weakness in any limb  Numbness or tingling anywhere  Visual changes or loss of sight in either eye  Sudden onset of slurred speech or inability to speak  Dizziness or faintness  New pain, swelling, redness or heat in any extremity  New SOB or chest pain  Symptoms associated with blood clotting/low INR reviewed and verbalizes understanding.    Instructed to contact the clinic with any unusual bleeding or bruising, any changes in medications, diet, health status, lifestyle, or any other changes, questions or concerns. Verbalized understanding of all discussed.   
no

## 2023-10-17 LAB
SURGICAL PATHOLOGY STUDY: SIGNIFICANT CHANGE UP
SURGICAL PATHOLOGY STUDY: SIGNIFICANT CHANGE UP

## 2024-01-31 NOTE — ED BEHAVIORAL HEALTH ASSESSMENT NOTE - OTHER PAST PSYCHIATRIC HISTORY (INCLUDE DETAILS REGARDING ONSET, COURSE OF ILLNESS, INPATIENT/OUTPATIENT TREATMENT)
Detail Level: Detailed Size Of Lesion In Cm (Optional): 0 per patient 10 prior psychiatric admissions, last at [facility] when he was discharged 3 weeks ago, reason for admission was a suicide attempt

## 2024-04-01 NOTE — H&P PST ADULT - FALL HARM RISK - FALLEN IN PAST
Additional Notes: Declined tx, if becomes bothersome rtc for further tx . Render Risk Assessment In Note?: no Detail Level: Simple No

## 2024-05-24 NOTE — H&P PST ADULT - VENOUS THROMBOEMBOLISM FOR WOMEN ONLY
Comments:     Last Office Visit (last PCP visit):   3/5/2024    Next Visit Date:  Future Appointments   Date Time Provider Department Center   9/18/2024 10:00 AM Goldstein, Tam, MD [facility]   11/22/2024  8:45 AM Mercado, Lenard S, PA [facility]       **If hasn't been seen in over a year OR hasn't followed up according to last diabetes/ADHD visit, make appointment for patient before sending refill to provider.    Rx requested:  Requested Prescriptions     Pending Prescriptions Disp Refills    FLUoxetine (PROZAC) 40 MG capsule [Pharmacy Med Name: FLUOXETINE HCL 40 MG CAPSULE] 90 capsule 3     Sig: TAKE 1 CAPSULE BY MOUTH EVERY DAY               
(0) indicator not present

## 2025-01-28 NOTE — ED ADULT TRIAGE NOTE - PAIN RATING/NUMBER SCALE (0-10): REST
10 Patient is an 86-year-old female with past medical history of dementia, hyperlipidemia, CHF, aortic stenosis status post TAVR, A-fib on Xarelto, hypothyroidism, recurrent [facility] Patient is an 86-year-old female with past medical history of dementia, hyperlipidemia, CHF, aortic stenosis status post TAVR, A-fib on Xarelto, hypothyroidism, recurrent UTIs presents from the [facility] for CC AMS. I spoke with patients' daughter Sara via telephone at [phone] who states that she and her sister were visiting her mother at the [place] this morning and noticed she seemed very sleepy/out of it and not really responding to questions but her eyes were open but not following commands.  They state she woke up briefly and was able to talk to them but then stopped answering questions again which is abnormal for her.  They states she is normally oriented x 1-2 at baseline.  They state that she has not had any infectious symptoms including fevers cough chills, dysuria. They state that this morning patient got her medications which per chart review include sertraline, quetiapine, alprazolam and did not eat anything afterwards so this may have contributed to her symptoms. Deny any syncopal episodes or chest pain.

## 2025-01-30 PROBLEM — K63.5 POLYP OF COLON: Chronic | Status: ACTIVE | Noted: 2023-09-27

## 2025-02-05 NOTE — PRE-ANESTHESIA EVALUATION ADULT - LAST STRESS TEST
Patient tolerated 3hr HD txt well today. 1L of fluid was removed.  Epo given during HD as per order. Lab collected at end of txt. At the end of HD blood was rinsed back to pt successfully. CVC lines flushed and locked with saline, then capped. Pt had no S/S of distress or discomfort from HD.             HD txt overseen by ELICIA Nuñez RN        Patient Name: Tico Day  Patient : 1963  MRN: [id number]     Acct: [id number]  Date of Admission: 2025  Room/Bed: -A  Code Status:  Full Code  Allergies:   Allergies   Allergen Reactions    Reglan [Metoclopramide]     Remeron [Mirtazapine]      Diagnosis:    Patient Active Problem List   Diagnosis    Acute on chronic respiratory failure    Moderate malnutrition (HCC)    Pneumonia of both lower lobes due to Pseudomonas species (HCC)    Central line infection    Acute on chronic hypoxic respiratory failure    Decompensation of cirrhosis of liver (HCC)    Ascites of liver    Hyperkalemia         Treatment:  Hemodialysis 1:1  Priority: Routine  Location: ICU    Diabetic: Yes  NPO: Yes  Isolation Precautions: Contact     Consent for Treatment Verified: Yes  Blood Consent Verified: Not Applicable     Safety Verified: Identify (I), Consent (C), Equipment (E), HepB Status (B), Orders Complete (O), Access Verified (A), and Timeliness (T)  Time out performed prior to access at 0850 hours.    Report Received from Primary RN at 0700 hours.  Primary RN (First Initial, Last Name, Title): OK Cosby RN  Incapacitated Nurse Education Completed: Not Applicable     HBsAg ONLY:  Date Drawn: February 3, 2025       Results: Negative  HBsAb:  Date Drawn:  February 3, 2025       Results: Susceptible <10    Order  Dialysis Bath  K+ (Potassium): 2  Ca+ (Calcium): 2.5  Na+ (Sodium): 137  HCO3 (Bicarb): 35  Bicarbonate Concentrate Lot No.: [id number]  Acid Concentrate Lot No.: [id number]     Na+ Modeling: Not Applicable  Dialyzer: F180  Dialysate Temperature (C):  36  Blood Flow 
Denies

## 2025-02-13 ENCOUNTER — OUTPATIENT (OUTPATIENT)
Dept: OUTPATIENT SERVICES | Facility: HOSPITAL | Age: 55
LOS: 1 days | End: 2025-02-13
Payer: MEDICARE

## 2025-02-13 VITALS
OXYGEN SATURATION: 96 % | HEIGHT: 72 IN | RESPIRATION RATE: 14 BRPM | WEIGHT: 182.98 LBS | TEMPERATURE: 98 F | DIASTOLIC BLOOD PRESSURE: 88 MMHG | SYSTOLIC BLOOD PRESSURE: 117 MMHG | HEART RATE: 85 BPM

## 2025-02-13 DIAGNOSIS — Z98.890 OTHER SPECIFIED POSTPROCEDURAL STATES: Chronic | ICD-10-CM

## 2025-02-13 DIAGNOSIS — K02.62 DENTAL CARIES ON SMOOTH SURFACE PENETRATING INTO DENTIN: ICD-10-CM

## 2025-02-13 DIAGNOSIS — Z92.89 PERSONAL HISTORY OF OTHER MEDICAL TREATMENT: Chronic | ICD-10-CM

## 2025-02-13 DIAGNOSIS — K05.6 PERIODONTAL DISEASE, UNSPECIFIED: ICD-10-CM

## 2025-02-13 DIAGNOSIS — Z01.818 ENCOUNTER FOR OTHER PREPROCEDURAL EXAMINATION: ICD-10-CM

## 2025-02-13 DIAGNOSIS — I10 ESSENTIAL (PRIMARY) HYPERTENSION: ICD-10-CM

## 2025-02-13 LAB
ANION GAP SERPL CALC-SCNC: 13 MMOL/L — SIGNIFICANT CHANGE UP (ref 5–17)
BUN SERPL-MCNC: 13 MG/DL — SIGNIFICANT CHANGE UP (ref 7–23)
CALCIUM SERPL-MCNC: 9 MG/DL — SIGNIFICANT CHANGE UP (ref 8.4–10.5)
CHLORIDE SERPL-SCNC: 103 MMOL/L — SIGNIFICANT CHANGE UP (ref 96–108)
CO2 SERPL-SCNC: 22 MMOL/L — SIGNIFICANT CHANGE UP (ref 22–31)
CREAT SERPL-MCNC: 0.89 MG/DL — SIGNIFICANT CHANGE UP (ref 0.5–1.3)
EGFR: 101 ML/MIN/1.73M2 — SIGNIFICANT CHANGE UP
GLUCOSE SERPL-MCNC: 132 MG/DL — HIGH (ref 70–99)
HCT VFR BLD CALC: 44 % — SIGNIFICANT CHANGE UP (ref 39–50)
HGB BLD-MCNC: 14.4 G/DL — SIGNIFICANT CHANGE UP (ref 13–17)
MCHC RBC-ENTMCNC: 29.8 PG — SIGNIFICANT CHANGE UP (ref 27–34)
MCHC RBC-ENTMCNC: 32.7 G/DL — SIGNIFICANT CHANGE UP (ref 32–36)
MCV RBC AUTO: 90.9 FL — SIGNIFICANT CHANGE UP (ref 80–100)
NRBC BLD AUTO-RTO: 0 /100 WBCS — SIGNIFICANT CHANGE UP (ref 0–0)
PLATELET # BLD AUTO: 184 K/UL — SIGNIFICANT CHANGE UP (ref 150–400)
POTASSIUM SERPL-MCNC: 4.4 MMOL/L — SIGNIFICANT CHANGE UP (ref 3.5–5.3)
POTASSIUM SERPL-SCNC: 4.4 MMOL/L — SIGNIFICANT CHANGE UP (ref 3.5–5.3)
RBC # BLD: 4.84 M/UL — SIGNIFICANT CHANGE UP (ref 4.2–5.8)
RBC # FLD: 13.9 % — SIGNIFICANT CHANGE UP (ref 10.3–14.5)
SODIUM SERPL-SCNC: 138 MMOL/L — SIGNIFICANT CHANGE UP (ref 135–145)
WBC # BLD: 9.1 K/UL — SIGNIFICANT CHANGE UP (ref 3.8–10.5)
WBC # FLD AUTO: 9.1 K/UL — SIGNIFICANT CHANGE UP (ref 3.8–10.5)

## 2025-02-13 PROCEDURE — 85027 COMPLETE CBC AUTOMATED: CPT

## 2025-02-13 PROCEDURE — G0463: CPT

## 2025-02-13 PROCEDURE — 80048 BASIC METABOLIC PNL TOTAL CA: CPT

## 2025-02-13 RX ORDER — METOPROLOL SUCCINATE 25 MG
1 TABLET, EXTENDED RELEASE 24 HR ORAL
Refills: 0 | DISCHARGE

## 2025-02-13 RX ORDER — ROSUVASTATIN CALCIUM 10 MG/1
1 TABLET, FILM COATED ORAL
Refills: 0 | DISCHARGE

## 2025-02-13 NOTE — H&P PST ADULT - HISTORY OF PRESENT ILLNESS
55 yo male w/ PMH HTN, HLD, seizure disorder (patient and Du deny any in the past 4 years, not on any medications), esophagitis and Virgen syndrome, excessive ETOH use (per patient, last drink 3-4 years ago), bipolar disorder, Intellectual delay (pt is a resident of [facility]). Du reports patient had a fall in spring of 2024, trauma to mouth which prompted x-rays. Per Du, the x-ray revealed extensive periodontal disease which led to dental evaluation. He is planned for a comprehensive dental treatment under anesthesia on 2/27/25.

## 2025-02-13 NOTE — H&P PST ADULT - ASSESSMENT
DASI score: 7  DASI activity: able to ambulate, walk up 4 flights of stairs with no issue  Loose teeth or denture: +loose teeth, planned for dental exam

## 2025-02-13 NOTE — H&P PST ADULT - PROBLEM SELECTOR PLAN 1
comprehensive dental treatment under anesthesia  blood work done at PST (CBC, BMP)  pre procedure instructions discussed.

## 2025-02-13 NOTE — H&P PST ADULT - NSICDXPASTMEDICALHX_GEN_ALL_CORE_FT
PAST MEDICAL HISTORY:  Alcohol abuse last drink 2017    Bipolar illness hospitalized for depression and suicidal thoughs 2017 ([facility])    Colon polyps     Current smoker     Hyperlipidemia     Hypertension     Schizoaffective disorder     Seizures stated had seizures once a month, and not on any seizure med, have no neurologist , last seizures 2 weeks ago 2/2022, grand mal

## 2025-03-13 NOTE — PRE-ANESTHESIA EVALUATION ADULT - NSANTHDIETYNSD_GEN_ALL_CORE
Your information:  Name: Lavon Wasserman  : 1957    Your instructions:    Please take all medications as prescribed and keep all follow up appointments.     What to do after you leave the hospital:    Recommended diet: regular diet    Recommended activity: activity as tolerated        The following personal items were collected during your admission and were returned to you:    Belongings  Dental Appliances: None  Vision - Corrective Lenses: Eyeglasses  Hearing Aid: None  Clothing: Socks, Shirt, Footwear, Undergarments, At bedside, Pants  Jewelry: None  Electronic Devices: At bedside, Cell Phone,   Weapons (Notify Protective Services/Security): None  Home Medications: None  Valuables Given To: Patient  Provide Name(s) of Who Valuable(s) Were Given To: patient    Information obtained by:  By signing below, I understand that if any problems occur once I leave the hospital I am to contact Dr. Sidhu or in the event of an emergency dial 911.  I understand and acknowledge receipt of the instructions indicated above.    Yes

## 2025-05-06 ENCOUNTER — OUTPATIENT (OUTPATIENT)
Dept: OUTPATIENT SERVICES | Facility: HOSPITAL | Age: 55
LOS: 1 days | End: 2025-05-06
Payer: MEDICARE

## 2025-05-06 VITALS
RESPIRATION RATE: 16 BRPM | SYSTOLIC BLOOD PRESSURE: 104 MMHG | OXYGEN SATURATION: 98 % | HEIGHT: 72 IN | DIASTOLIC BLOOD PRESSURE: 72 MMHG | HEART RATE: 68 BPM | TEMPERATURE: 98 F | WEIGHT: 175.93 LBS

## 2025-05-06 DIAGNOSIS — Z92.89 PERSONAL HISTORY OF OTHER MEDICAL TREATMENT: Chronic | ICD-10-CM

## 2025-05-06 DIAGNOSIS — K02.62 DENTAL CARIES ON SMOOTH SURFACE PENETRATING INTO DENTIN: ICD-10-CM

## 2025-05-06 DIAGNOSIS — K05.6 PERIODONTAL DISEASE, UNSPECIFIED: ICD-10-CM

## 2025-05-06 DIAGNOSIS — Z98.890 OTHER SPECIFIED POSTPROCEDURAL STATES: Chronic | ICD-10-CM

## 2025-05-06 DIAGNOSIS — Z01.818 ENCOUNTER FOR OTHER PREPROCEDURAL EXAMINATION: ICD-10-CM

## 2025-05-06 PROBLEM — I10 ESSENTIAL (PRIMARY) HYPERTENSION: Chronic | Status: ACTIVE | Noted: 2025-02-13

## 2025-05-06 PROBLEM — E78.5 HYPERLIPIDEMIA, UNSPECIFIED: Chronic | Status: ACTIVE | Noted: 2025-02-13

## 2025-05-06 LAB
ANION GAP SERPL CALC-SCNC: 11 MMOL/L — SIGNIFICANT CHANGE UP (ref 5–17)
BUN SERPL-MCNC: 11 MG/DL — SIGNIFICANT CHANGE UP (ref 7–23)
CALCIUM SERPL-MCNC: 8.8 MG/DL — SIGNIFICANT CHANGE UP (ref 8.4–10.5)
CHLORIDE SERPL-SCNC: 104 MMOL/L — SIGNIFICANT CHANGE UP (ref 96–108)
CO2 SERPL-SCNC: 26 MMOL/L — SIGNIFICANT CHANGE UP (ref 22–31)
CREAT SERPL-MCNC: 0.72 MG/DL — SIGNIFICANT CHANGE UP (ref 0.5–1.3)
EGFR: 107 ML/MIN/1.73M2 — SIGNIFICANT CHANGE UP
EGFR: 107 ML/MIN/1.73M2 — SIGNIFICANT CHANGE UP
GLUCOSE SERPL-MCNC: 95 MG/DL — SIGNIFICANT CHANGE UP (ref 70–99)
HCT VFR BLD CALC: 44.1 % — SIGNIFICANT CHANGE UP (ref 39–50)
HGB BLD-MCNC: 14.3 G/DL — SIGNIFICANT CHANGE UP (ref 13–17)
MCHC RBC-ENTMCNC: 29.5 PG — SIGNIFICANT CHANGE UP (ref 27–34)
MCHC RBC-ENTMCNC: 32.4 G/DL — SIGNIFICANT CHANGE UP (ref 32–36)
MCV RBC AUTO: 90.9 FL — SIGNIFICANT CHANGE UP (ref 80–100)
NRBC BLD AUTO-RTO: 0 /100 WBCS — SIGNIFICANT CHANGE UP (ref 0–0)
PLATELET # BLD AUTO: 200 K/UL — SIGNIFICANT CHANGE UP (ref 150–400)
POTASSIUM SERPL-MCNC: 4.4 MMOL/L — SIGNIFICANT CHANGE UP (ref 3.5–5.3)
POTASSIUM SERPL-SCNC: 4.4 MMOL/L — SIGNIFICANT CHANGE UP (ref 3.5–5.3)
RBC # BLD: 4.85 M/UL — SIGNIFICANT CHANGE UP (ref 4.2–5.8)
RBC # FLD: 14.1 % — SIGNIFICANT CHANGE UP (ref 10.3–14.5)
SODIUM SERPL-SCNC: 141 MMOL/L — SIGNIFICANT CHANGE UP (ref 135–145)
WBC # BLD: 8.14 K/UL — SIGNIFICANT CHANGE UP (ref 3.8–10.5)
WBC # FLD AUTO: 8.14 K/UL — SIGNIFICANT CHANGE UP (ref 3.8–10.5)

## 2025-05-06 PROCEDURE — G0463: CPT

## 2025-05-06 PROCEDURE — 85027 COMPLETE CBC AUTOMATED: CPT

## 2025-05-06 PROCEDURE — 80048 BASIC METABOLIC PNL TOTAL CA: CPT

## 2025-05-06 NOTE — H&P PST ADULT - HISTORY OF PRESENT ILLNESS
55 yo male w/ PMH HTN, HLD, seizure disorder (patient and Du deny any in the past 4 years, not on any medications), esophagitis and Virgen syndrome, excessive ETOH use (per patient, last drink 3-4 years ago), bipolar disorder, Intellectual delay (pt is a resident of [facility]). Du reports patient had a fall in spring of 2024, trauma to mouth which prompted x-rays. Per Du, the x-ray revealed extensive periodontal disease which led to dental evaluation. He is planned for a comprehensive dental treatment under anesthesia on 2/27/25.  54 yo male, current smoker,  w/ PMH HTN, HLD, seizure disorder (patient and Du ( counselor )  deny any seizures  in the past 4 years, not on any medications), esophagitis and Virgen syndrome, h/o  excessive ETOH use (per patient, last drink 3-4 years ago), bipolar disorder, Intellectual delay (pt is a resident of [facility], assisted living. Signing his own consents. ). Patient complaints of the tooth ache , having multiple missing teeth, presents to [facility] for scheduled comprehensive dental treatment under anesthesia on 5/29/2025 with Dr. Sheffield. Denies fever, chills, no acute complaints.       Initially procedure was scheduled for 2/27/2025, was canceled due to patient's incidental fall in the facility that caused hospitalization ( unsure which hospital ) . Patient denies any injuries or fractures.  Patient is poor historian, will schedule PCP preop evaluation .     *** off note patient has daughter Jessica Hurley ( her phone number is disconnected ) unable to locate. ***  Contact Ame Raza or Du Gaxiola from [facility]/ assisted living  [phone]***

## 2025-05-06 NOTE — H&P PST ADULT - NSICDXPASTMEDICALHX_GEN_ALL_CORE_FT
PAST MEDICAL HISTORY:  Alcohol abuse last drink 2017    Bipolar illness hospitalized for depression and suicidal thoughs 2017 ([facility])    Colon polyps     Current smoker     Hyperlipidemia     Hypertension     Schizoaffective disorder     Seizures stated had seizures once a month, and not on any seizure med, have no neurologist , last seizures 2 weeks ago 2/2022, grand mal     PAST MEDICAL HISTORY:  Alcohol abuse last drink 2017    Bipolar illness hospitalized for depression and suicidal thoughs 2017 ([facility])    Colon polyps     Current smoker     Hyperlipidemia     Hypertension     Periodontal disease     Schizoaffective disorder     Seizures stated had seizures once a month, and not on any seizure med, have no neurologist , last seizures 2 weeks ago 2/2022, grand mal

## 2025-05-06 NOTE — H&P PST ADULT - NEGATIVE SKIN SYMPTOMS
Patient needs PHF appointment. Please call patient and schedule appointment.     no rash/no itching/no dryness

## 2025-05-06 NOTE — H&P PST ADULT - PROBLEM SELECTOR PLAN 1
Comprehensive dental treatment under general anesthesia   PST Instructions provided to patient and counselor Du , both verbalized understanding   CBC, BMP collected and sent   Procedure was cancelled due to hospitalization , patient will schedule an appointment with PCP for preop visit , form given.   Continue medications as prescribed.   Do not take vitamins the morning of procedure .

## 2025-05-06 NOTE — H&P PST ADULT - ASSESSMENT
Airway : no airway abnormalities , denies prior anesthesia complications   Mallampati : I  Denies loose teeth / Missing teeth     Ignacio abrasion risk : Denies

## 2025-05-06 NOTE — H&P PST ADULT - PRO ARRIVE FROM
[facility] [phone] , Program  Manager  Ame Raza  [phone]   counselor Du Gaxiola ( same number )/home

## 2025-05-08 ENCOUNTER — OUTPATIENT (OUTPATIENT)
Dept: OUTPATIENT SERVICES | Facility: HOSPITAL | Age: 55
LOS: 1 days | End: 2025-05-08
Payer: MEDICARE

## 2025-05-08 ENCOUNTER — TRANSCRIPTION ENCOUNTER (OUTPATIENT)
Age: 55
End: 2025-05-08

## 2025-05-08 VITALS
DIASTOLIC BLOOD PRESSURE: 79 MMHG | HEART RATE: 63 BPM | TEMPERATURE: 97 F | RESPIRATION RATE: 15 BRPM | OXYGEN SATURATION: 96 % | SYSTOLIC BLOOD PRESSURE: 131 MMHG

## 2025-05-08 VITALS
HEIGHT: 72 IN | HEART RATE: 60 BPM | OXYGEN SATURATION: 98 % | RESPIRATION RATE: 16 BRPM | TEMPERATURE: 98 F | DIASTOLIC BLOOD PRESSURE: 83 MMHG | WEIGHT: 175.93 LBS | SYSTOLIC BLOOD PRESSURE: 130 MMHG

## 2025-05-08 DIAGNOSIS — Z92.89 PERSONAL HISTORY OF OTHER MEDICAL TREATMENT: Chronic | ICD-10-CM

## 2025-05-08 DIAGNOSIS — Z98.890 OTHER SPECIFIED POSTPROCEDURAL STATES: Chronic | ICD-10-CM

## 2025-05-08 DIAGNOSIS — K05.6 PERIODONTAL DISEASE, UNSPECIFIED: ICD-10-CM

## 2025-05-08 DIAGNOSIS — K02.62 DENTAL CARIES ON SMOOTH SURFACE PENETRATING INTO DENTIN: ICD-10-CM

## 2025-05-08 PROCEDURE — C1889: CPT

## 2025-05-08 PROCEDURE — D7210: CPT

## 2025-05-08 PROCEDURE — D7321: CPT

## 2025-05-08 PROCEDURE — D7320: CPT

## 2025-05-08 PROCEDURE — C9399: CPT

## 2025-05-08 DEVICE — SURGICEL 2 X 14": Type: IMPLANTABLE DEVICE | Status: FUNCTIONAL

## 2025-05-08 RX ORDER — LIDOCAINE HCL/PF 10 MG/ML
0.2 VIAL (ML) INJECTION ONCE
Refills: 0 | Status: ACTIVE | OUTPATIENT
Start: 2025-05-08

## 2025-05-08 RX ORDER — FENTANYL CITRATE-0.9 % NACL/PF 100MCG/2ML
25 SYRINGE (ML) INTRAVENOUS
Refills: 0 | Status: DISCONTINUED | OUTPATIENT
Start: 2025-05-08 | End: 2025-05-08

## 2025-05-08 RX ORDER — SODIUM CHLORIDE 9 G/1000ML
1000 INJECTION, SOLUTION INTRAVENOUS
Refills: 0 | Status: ACTIVE | OUTPATIENT
Start: 2025-05-08 | End: 2026-04-06

## 2025-05-08 RX ORDER — ONDANSETRON HCL/PF 4 MG/2 ML
4 VIAL (ML) INJECTION ONCE
Refills: 0 | Status: ACTIVE | OUTPATIENT
Start: 2025-05-08 | End: 2026-04-06

## 2025-05-08 RX ORDER — BUPROPION HYDROBROMIDE 522 MG/1
1 TABLET, EXTENDED RELEASE ORAL
Refills: 0 | DISCHARGE

## 2025-05-08 RX ORDER — LIDOCAINE HCL/PF 10 MG/ML
0.2 VIAL (ML) INJECTION ONCE
Refills: 0 | Status: ACTIVE | OUTPATIENT
Start: 2025-05-08 | End: 2025-05-08

## 2025-05-08 RX ADMIN — SODIUM CHLORIDE 100 MILLILITER(S): 9 INJECTION, SOLUTION INTRAVENOUS at 14:21

## 2025-05-08 NOTE — ASU DISCHARGE PLAN (ADULT/PEDIATRIC) - FINANCIAL ASSISTANCE
[facility] provides services at a reduced cost to those who are determined to be eligible through [facility]’s financial assistance program. Information regarding [facility]’s financial assistance program can be found by going to [email or web address] or by calling [phone].
Yes

## 2025-05-08 NOTE — ASU DISCHARGE PLAN (ADULT/PEDIATRIC) - NURSING INSTRUCTIONS
OK to take Tylenol/Acetaminophen at 10'05pm _ for pain and every 6 hours after as needed. OK to take Motrin/Ibuprofen after 10'35pm _ for pain and every 6 hours after as needed.

## 2025-05-08 NOTE — ASU PATIENT PROFILE, ADULT - PRO ARRIVE FROM
[facility] [phone] , Program  Manager  Ame Raza  [phone]   counselor Du Gaxiola ( same number )/home

## 2025-05-08 NOTE — ASU DISCHARGE PLAN (ADULT/PEDIATRIC) - ASU DC SPECIAL INSTRUCTIONSFT
comprehensive dental treatment under general anesthesia, multiple extractions performed as all remaining teeth were hopeless     no straw for two days and keep head elevated for the next two days and nights     tylenol prn pain and give him tylenol for the next two-4 days for comfort     see DR Sheffield in one week, [place] [facility] [phone] at 10:15 am     resume all medications    return to routine activities tomorrow if patient feels well    ice as tolerated twenty minutes on and off     if there is any excess bleeding apply pressure with gauze for 20 minutes comprehensive dental treatment under general anesthesia, multiple extractions performed as all remaining teeth were hopeless     no straw for two days and keep head elevated for the next two days and nights     tylenol prn pain and give him tylenol for the next two-4 days for comfort     see DR Sheffield in one week, [place] [phone] at 10:15 am     resume all medications    amoxicillin was sent to preferred pharmacy and he can start the antibiotics tomorrow    return to routine activities tomorrow if patient feels well    ice as tolerated twenty minutes on and off     if there is any excess bleeding apply pressure with gauze for 20 minutes

## 2025-05-08 NOTE — ASU PATIENT PROFILE, ADULT - NSICDXPASTMEDICALHX_GEN_ALL_CORE_FT
PAST MEDICAL HISTORY:  Alcohol abuse last drink 2017    Bipolar illness hospitalized for depression and suicidal thoughs 2017 ([facility])    Colon polyps     Current smoker     Hyperlipidemia     Hypertension     Periodontal disease     Schizoaffective disorder     Seizures stated had seizures once a month, and not on any seizure med, have no neurologist , last seizures 2 weeks ago 2/2022, grand mal

## 2025-05-08 NOTE — ASU DISCHARGE PLAN (ADULT/PEDIATRIC) - OK TO LEAVE MESSAGE ON VOICEMAIL
----- Message from [place] NATALIE Costa III, MD sent at 10/4/2022  9:04 AM CDT -----  I have reviewed his CT scans which show evidence of severe chronic recurrent sinus disease involving all of his paranasal sinuses.  It also shows a right-sided posterior septal deviation.  He could benefit from Medtronic Fess addressing all of his paranasal sinuses as well as possible septoplasty and submucous resection turbinates.        
----- Message from [place] NATALIE Costa III, MD sent at 10/4/2022  9:04 AM CDT -----  I have reviewed his CT scans which show evidence of severe chronic recurrent sinus disease involving all of his paranasal sinuses.  It also shows a right-sided posterior septal deviation.  He could benefit from Medtronic Fess addressing all of his paranasal sinuses as well as possible septoplasty and submucous resection turbinates.        
Yes

## 2025-05-08 NOTE — ASU PATIENT PROFILE, ADULT - FALL HARM RISK - HARM RISK INTERVENTIONS

## 2025-05-20 PROBLEM — K05.6 PERIODONTAL DISEASE, UNSPECIFIED: Chronic | Status: ACTIVE | Noted: 2025-05-06

## 2025-06-03 NOTE — ED ADULT TRIAGE NOTE - NS ED NURSE BANDS TYPE
Current patient location: Pt at [facility]    Is the patient currently in the state of [place]? YES    Visit mode: VIDEO    If the visit is dropped, the patient can be reconnected by:VIDEO VISIT: Text to cell phone:   Telephone Information:   Mobile [phone]       Will anyone else be joining the visit? NO  (If patient encounters technical issues they should call [phone])    Are changes needed to the allergy or medication list? Pt stated no changes to allergies and Pt stated no med changes    Are refills needed on medications prescribed by this physician? Discuss with provider    Rooming Documentation:  Questionnaire(s) not pre-assigned    Reason for visit: BERNIE WILKERSONF     
Name band;

## 2025-06-10 ENCOUNTER — APPOINTMENT (OUTPATIENT)
Dept: GASTROENTEROLOGY | Facility: CLINIC | Age: 55
End: 2025-06-10

## 2025-06-24 NOTE — ED ADULT NURSE NOTE - ATTEMPT TO OOB
Recommend following up outpatient with primary care doctor for HIDA scan to assess for biliary dyskinesia.   no

## 2025-09-04 ENCOUNTER — APPOINTMENT (OUTPATIENT)
Dept: GASTROENTEROLOGY | Facility: CLINIC | Age: 55
End: 2025-09-04

## (undated) DEVICE — FORCEP RADIAL JAW 4 JUMBO 2.8MM 3.2MM 240CM ORANGE DISP

## (undated) DEVICE — SYR LUER LOK 50CC

## (undated) DEVICE — POSITIONER FOAM EGG CRATE ULNAR 2PCS (PINK)

## (undated) DEVICE — SENSOR O2 FINGER ADULT

## (undated) DEVICE — CATH IV SAFE BC 20G X 1.16" (PINK)

## (undated) DEVICE — PACK IV START WITH CHG

## (undated) DEVICE — BRUSH COLONOSCOPY CYTOLOGY

## (undated) DEVICE — DRAPE INSTRUMENT POUCH 6.75" X 11"

## (undated) DEVICE — GOWN TRIMAX LG

## (undated) DEVICE — FOLEY HOLDER STATLOCK 2 WAY ADULT

## (undated) DEVICE — VAGINAL PACKING 2 X 6"

## (undated) DEVICE — VENODYNE/SCD SLEEVE CALF MEDIUM

## (undated) DEVICE — TUBING SUCTION 20FT

## (undated) DEVICE — SNARE EXACTO COLD 9MMX230CM

## (undated) DEVICE — DRAPE SPLIT SHEET 77" X 108"

## (undated) DEVICE — BIOPSY FORCEP RADIAL JAW 4 STANDARD WITH NEEDLE

## (undated) DEVICE — CATH IV SAFE BC 22G X 1" (BLUE)

## (undated) DEVICE — PACK BASIC GOWN

## (undated) DEVICE — GLV 6.5 PROTEXIS (WHITE)

## (undated) DEVICE — SOL IRR POUR NS 0.9% 500ML

## (undated) DEVICE — ATTACHMENT DISTAL 4X13.4MM

## (undated) DEVICE — GLV 7.5 PROTEXIS (WHITE)

## (undated) DEVICE — ELCTR GROUNDING PAD ADULT COVIDIEN

## (undated) DEVICE — SPONGE END-STRUNG CYLINDRICAL .5X1.5"

## (undated) DEVICE — TUBING IV SET GRAVITY 3Y 100" MACRO

## (undated) DEVICE — IRRIGATOR BIO SHIELD

## (undated) DEVICE — SENSOR O2 FINGER ADULT 24/CA

## (undated) DEVICE — GLV 7 PROTEXIS (WHITE)

## (undated) DEVICE — SOL IRR POUR H2O 250ML

## (undated) DEVICE — VISITEC 4X4

## (undated) DEVICE — ELCTR BOVIE TIP NEEDLE INSULATED 2.8" EDGE

## (undated) DEVICE — SPECIMEN CONTAINER 100ML

## (undated) DEVICE — VENODYNE/SCD SLEEVE CALF LARGE

## (undated) DEVICE — SNARE CAPTIVATOR II 20MM

## (undated) DEVICE — MEDICATION LABELS W MARKER

## (undated) DEVICE — DRAPE LIGHT HANDLE COVER (BLUE)

## (undated) DEVICE — POLY TRAP ETRAP

## (undated) DEVICE — SUT CHROMIC 3-0 30" C-13

## (undated) DEVICE — SUCTION YANKAUER NO CONTROL VENT

## (undated) DEVICE — SOL INJ NS 0.9% 500ML 2 PORT

## (undated) DEVICE — ELCTR BOVIE PENCIL SMOKE EVACUATION

## (undated) DEVICE — SNARE CAPTIVATOR COLD RND STIFF 10X2.4X2.8MM 240CM

## (undated) DEVICE — CLAMP BX HOT RAD JAW 3

## (undated) DEVICE — DRAPE MAYO STAND 30"

## (undated) DEVICE — TUBING SUCTION CONN 6FT STERILE

## (undated) DEVICE — GLV 6 PROTEXIS (BLUE)

## (undated) DEVICE — FOLEY TRAY 16FR 5CC LTX UMETER CLOSED

## (undated) DEVICE — DRAPE TOWEL BLUE 17" X 24"

## (undated) DEVICE — DRAPE MAGNETIC INSTRUMENT MEDIUM

## (undated) DEVICE — PACK DENTAL

## (undated) DEVICE — WARMING BLANKET LOWER ADULT

## (undated) DEVICE — PROBE ERBEJET FLEXIBLE 1.3MMX2.2M

## (undated) DEVICE — FORCEP RADIAL JAW 4 W NDL 2.4MM 2.8MM 240CM ORANGE DISP

## (undated) DEVICE — LAP PAD 18 X 18"

## (undated) DEVICE — SUT CHROMIC 3-0 30" V-20